# Patient Record
Sex: FEMALE | Race: WHITE | NOT HISPANIC OR LATINO | Employment: OTHER | URBAN - NONMETROPOLITAN AREA
[De-identification: names, ages, dates, MRNs, and addresses within clinical notes are randomized per-mention and may not be internally consistent; named-entity substitution may affect disease eponyms.]

---

## 2017-10-30 ENCOUNTER — OFFICE VISIT (OUTPATIENT)
Dept: OTOLARYNGOLOGY | Facility: CLINIC | Age: 67
End: 2017-10-30

## 2017-10-30 ENCOUNTER — PROCEDURE VISIT (OUTPATIENT)
Dept: OTOLARYNGOLOGY | Facility: CLINIC | Age: 67
End: 2017-10-30

## 2017-10-30 VITALS
DIASTOLIC BLOOD PRESSURE: 92 MMHG | SYSTOLIC BLOOD PRESSURE: 143 MMHG | BODY MASS INDEX: 35.49 KG/M2 | TEMPERATURE: 97.9 F | HEIGHT: 65 IN | HEART RATE: 73 BPM | WEIGHT: 213 LBS

## 2017-10-30 DIAGNOSIS — H90.3 SENSORINEURAL HEARING LOSS (SNHL), BILATERAL: ICD-10-CM

## 2017-10-30 DIAGNOSIS — H90.3 SENSORINEURAL HEARING LOSS (SNHL) OF BOTH EARS: Primary | ICD-10-CM

## 2017-10-30 DIAGNOSIS — H93.13 TINNITUS OF BOTH EARS: ICD-10-CM

## 2017-10-30 DIAGNOSIS — H93.13 TINNITUS OF BOTH EARS: Primary | ICD-10-CM

## 2017-10-30 PROCEDURE — 99213 OFFICE O/P EST LOW 20 MIN: CPT | Performed by: PHYSICIAN ASSISTANT

## 2017-10-30 RX ORDER — AMLODIPINE BESYLATE 5 MG/1
5 TABLET ORAL DAILY
COMMUNITY
Start: 2017-10-05

## 2017-10-30 RX ORDER — IBUPROFEN 800 MG/1
TABLET ORAL
Status: ON HOLD | COMMUNITY
Start: 2017-10-19 | End: 2018-10-04

## 2017-10-30 RX ORDER — LEVOTHYROXINE SODIUM 0.1 MG/1
TABLET ORAL
COMMUNITY
Start: 2017-08-24

## 2017-10-30 NOTE — PROGRESS NOTES
YOB: 1950  Location: [facility]  Location Address: [address] [place] [address], [facility], [address] [place] [address]  Location Phone: [phone]    Chief Complaint   Patient presents with   • Follow-up     ear problems       History of Present Illness  Kika Morocho is a 67 y.o. female.  Kika Morocho is here for follow up of ENT complaints. The patient has had problems with hearing loss  The symptoms are localized to both ears. The patient has had stable symptoms. The symptoms have been present for the last several years The symptoms are aggravated by  no identifiable factors. The symptoms are improved by no identifiable factors.    Procedure visit     10/30/2017  [facility]    Jessica Johnson CCC-LAST   Audiology    Tinnitus of both ears +1 more   Dx    Follow-up   Reason for visit    Progress Notes         CASE HISTORY DETAILS   Ms. Morocho presented to the clinic this date for a recheck of hearing due to bilateral tinnitus. She denied changes in symptoms since last visit.      SUMMARY   RIGHT  ¨ Otoscopy revealed clear EAC/Unremarkable TM.  ¨ Moderate high frequency sensorineural hearing loss (previously established).     LEFT  ¨ Otoscopy revealed clear EAC/Unremarkable TM.  ¨ Moderate high frequency sensorineural hearing loss (previously established).     Stable thresholds this date when compared to audiogram obtained in 2016.     RECOMMENDATIONS   Results of today's evaluation were discussed with Ms. Morocho and the following recommendations were made:  1. ENT evaluation today as scheduled.     AUDIOGRAM AND IMMITANCE        Selena Del Cid, ABDOULAYE-A  Audiologist      Instructions        Return for ENT evaluation today as scheduled..      Past Medical History:   Diagnosis Date   • Anemia    • GERD (gastroesophageal reflux disease)    • HLD (hyperlipidemia)    • HTN (hypertension)    • Osteopenia    • Tinnitus        Past Surgical History:   Procedure Laterality Date    • BREAST BIOPSY     • COLONOSCOPY     • DENTAL PROCEDURE     • GASTRIC BYPASS     • HERNIA REPAIR     • HYSTERECTOMY           Current Outpatient Prescriptions:   •  alendronate (FOSAMAX) 70 MG tablet, Take 70 mg by mouth Every 7 (Seven) Days., Disp: , Rfl:   •  amLODIPine (NORVASC) 2.5 MG tablet, , Disp: , Rfl:   •  atenolol (TENORMIN) 50 MG tablet, Take 50 mg by mouth Daily., Disp: , Rfl:   •  atorvastatin (LIPITOR) 20 MG tablet, Take 20 mg by mouth Daily., Disp: , Rfl:   •  Cyanocobalamin (VITAMIN B-12 IJ), Inject  as directed., Disp: , Rfl:   •  docusate sodium (COLACE) 100 MG capsule, Take 100 mg by mouth 2 (Two) Times a Day As Needed for constipation., Disp: , Rfl:   •  furosemide (LASIX) 40 MG tablet, Take 40 mg by mouth 2 (Two) Times a Day., Disp: , Rfl:   •  ibuprofen (ADVIL,MOTRIN) 800 MG tablet, , Disp: , Rfl:   •  levothyroxine (SYNTHROID, LEVOTHROID) 100 MCG tablet, , Disp: , Rfl:   •  losartan (COZAAR) 100 MG tablet, Take 100 mg by mouth Daily., Disp: , Rfl:   •  levothyroxine (SYNTHROID, LEVOTHROID) 150 MCG tablet, Take 150 mcg by mouth Daily., Disp: , Rfl:     Review of patient's allergies indicates no known allergies.    Family History   Problem Relation Age of Onset   • Diabetes Mother    • Stroke Mother    • Cancer Mother    • Heart failure Father    • Cancer Sister        Social History     Social History   • Marital status:      Spouse name: N/A   • Number of children: N/A   • Years of education: N/A     Occupational History   • Not on file.     Social History Main Topics   • Smoking status: Current Every Day Smoker     Packs/day: 0.50   • Smokeless tobacco: Never Used   • Alcohol use No   • Drug use: Defer   • Sexual activity: Not on file     Other Topics Concern   • Not on file     Social History Narrative       Review of Systems   Constitutional: Negative for activity change, appetite change, chills, diaphoresis, fatigue, fever and unexpected weight change.   HENT: Positive for  hearing loss. Negative for congestion, dental problem, drooling, ear discharge, ear pain, facial swelling, mouth sores, nosebleeds, postnasal drip, rhinorrhea, sinus pressure, sneezing, sore throat, tinnitus, trouble swallowing and voice change.    Eyes: Negative.    Respiratory: Negative.    Cardiovascular: Negative.    Gastrointestinal: Negative.    Endocrine: Negative.    Skin: Negative.    Allergic/Immunologic: Negative for environmental allergies, food allergies and immunocompromised state.   Neurological: Negative.    Hematological: Negative.    Psychiatric/Behavioral: Negative.        Vitals:    10/30/17 1423   BP: 143/92   Pulse: 73   Temp: 97.9 °F (36.6 °C)       Objective     Physical Exam  CONSTITUTIONAL: well nourished, alert, oriented, in no acute distress     COMMUNICATION AND VOICE: able to communicate normally, normal voice quality    HEAD: normocephalic, no lesions, atraumatic, no tenderness, no masses     FACE: appearance normal, no lesions, no tenderness, no deformities, facial motion symmetric    SALIVARY GLANDS: parotid glands with no tenderness, no swelling, no masses, submandibular glands with normal size, nontender    EYES: ocular motility normal, eyelids normal, orbits normal, no proptosis, conjunctiva normal , pupils equal, round     EARS:  Hearing: response to conversational voice normal bilaterally   External Ears: auricles without lesions  Otoscopic: tympanic membrane appearance normal, no lesions, no perforation, normal mobility, no fluid    NOSE:  External Nose: structure normal, no tenderness on palpation, no nasal discharge, no lesions, no evidence of trauma, nostrils patent   Intranasal Exam: nasal mucosa normal, vestibule within normal limits, inferior turbinate normal, nasal septum midline   Nasopharynx:     ORAL:  Lips: upper and lower lips without lesion   Teeth: dentition within normal limits for age   Gums: gingivae healthy   Oral Mucosa: oral mucosa normal, no mucosal lesions    Floor of Mouth: Warthin’s duct patent, mucosa normal  Tongue: lingual mucosa normal without lesions, normal tongue mobility   Palate: soft and hard palates with normal mucosa and structure  Oropharynx: oropharyngeal mucosa normal    NECK: neck appearance normal, no mass,  noted without erythema or tenderness    THYROID: no overt thyromegaly, no tenderness, nodules or mass present on palpation, position midline     LYMPH NODES: no lymphadenopathy    CHEST/RESPIRATORY: respiratory effort normal, normal breath sounds     CARDIOVASCULAR: rate and rhythm normal, extremities without cyanosis or edema      NEUROLOGIC/PSYCHIATRIC: oriented to time, place and person, mood normal, affect appropriate, CN II-XII intact grossly    Assessment/Plan   Problems Addressed this Visit        Nervous and Auditory    Tinnitus    Relevant Orders    Comprehensive Hearing Test    Hearing loss, sensorineural - Primary    Relevant Orders    Comprehensive Hearing Test        * Surgery not found *  Orders Placed This Encounter   Procedures   • Comprehensive Hearing Test     Standing Status:   Future     Standing Expiration Date:   10/30/2018     Order Specific Question:   Laterality     Answer:   Bilateral     Return in about 1 year (around 10/30/2018) for Recheck ears with audiogram.       Patient Instructions   Will continue to monitor hearing, if symptoms worsen advised to call for sooner appointment.

## 2017-10-30 NOTE — PROGRESS NOTES
CASE HISTORY DETAILS   Ms. Morocho presented to the clinic this date for a recheck of hearing due to bilateral tinnitus. She denied changes in symptoms since last visit.     SUMMARY   RIGHT  · Otoscopy revealed clear EAC/Unremarkable TM.  · Moderate high frequency sensorineural hearing loss (previously established).    LEFT  · Otoscopy revealed clear EAC/Unremarkable TM.  · Moderate high frequency sensorineural hearing loss (previously established).    Stable thresholds this date when compared to audiogram obtained in October 2016.    RECOMMENDATIONS   Results of today's evaluation were discussed with Ms. Morocho and the following recommendations were made:  1. ENT evaluation today as scheduled.    AUDIOGRAM AND IMMITANCE       Selena Del Cid, CCC-A  Audiologist

## 2018-06-14 ENCOUNTER — TRANSCRIBE ORDERS (OUTPATIENT)
Dept: ADMINISTRATIVE | Facility: HOSPITAL | Age: 68
End: 2018-06-14

## 2018-06-14 DIAGNOSIS — M54.16 LUMBAR RADICULOPATHY: Primary | ICD-10-CM

## 2018-06-19 ENCOUNTER — HOSPITAL ENCOUNTER (OUTPATIENT)
Dept: MRI IMAGING | Facility: HOSPITAL | Age: 68
Discharge: HOME | End: 2018-06-19
Attending: ORTHOPAEDIC SURGERY | Admitting: PHYSICIAN ASSISTANT

## 2018-06-19 DIAGNOSIS — M54.16 LUMBAR RADICULOPATHY: ICD-10-CM

## 2018-06-19 PROCEDURE — 72148 MRI LUMBAR SPINE W/O DYE: CPT

## 2018-10-04 ENCOUNTER — HOSPITAL ENCOUNTER (INPATIENT)
Facility: HOSPITAL | Age: 68
LOS: 1 days | Discharge: HOME | End: 2018-10-07
Attending: INTERNAL MEDICINE | Admitting: INTERNAL MEDICINE

## 2018-10-04 DIAGNOSIS — R07.89 ATYPICAL CHEST PAIN: ICD-10-CM

## 2018-10-04 LAB — TROPONIN I SERPL-MCNC: <0.012 NG/ML (ref 0–0.03)

## 2018-10-04 PROCEDURE — 25010000002 HYDROMORPHONE PER 4 MG

## 2018-10-04 PROCEDURE — G0378 HOSPITAL OBSERVATION PER HR: HCPCS

## 2018-10-04 PROCEDURE — 84484 ASSAY OF TROPONIN QUANT: CPT | Performed by: NURSE PRACTITIONER

## 2018-10-04 PROCEDURE — 25010000002 PROMETHAZINE PER 50 MG: Performed by: NURSE PRACTITIONER

## 2018-10-04 RX ORDER — SODIUM CHLORIDE 0.9 % (FLUSH) 0.9 %
3 SYRINGE (ML) INJECTION EVERY 12 HOURS SCHEDULED
Status: DISCONTINUED | OUTPATIENT
Start: 2018-10-04 | End: 2018-10-07 | Stop reason: HOSPADM

## 2018-10-04 RX ORDER — ATORVASTATIN CALCIUM 10 MG/1
20 TABLET, FILM COATED ORAL DAILY
Status: DISCONTINUED | OUTPATIENT
Start: 2018-10-05 | End: 2018-10-07 | Stop reason: HOSPADM

## 2018-10-04 RX ORDER — ONDANSETRON 2 MG/ML
4 INJECTION INTRAMUSCULAR; INTRAVENOUS EVERY 6 HOURS PRN
Status: DISCONTINUED | OUTPATIENT
Start: 2018-10-04 | End: 2018-10-07 | Stop reason: HOSPADM

## 2018-10-04 RX ORDER — SODIUM CHLORIDE 0.9 % (FLUSH) 0.9 %
3-10 SYRINGE (ML) INJECTION AS NEEDED
Status: DISCONTINUED | OUTPATIENT
Start: 2018-10-04 | End: 2018-10-07 | Stop reason: HOSPADM

## 2018-10-04 RX ORDER — LOSARTAN POTASSIUM 50 MG/1
100 TABLET ORAL DAILY
Status: DISCONTINUED | OUTPATIENT
Start: 2018-10-05 | End: 2018-10-07 | Stop reason: HOSPADM

## 2018-10-04 RX ORDER — PROMETHAZINE HYDROCHLORIDE 25 MG/ML
12.5 INJECTION, SOLUTION INTRAMUSCULAR; INTRAVENOUS EVERY 6 HOURS PRN
Status: DISCONTINUED | OUTPATIENT
Start: 2018-10-04 | End: 2018-10-07 | Stop reason: HOSPADM

## 2018-10-04 RX ORDER — HYDROMORPHONE HYDROCHLORIDE 1 MG/ML
0.5 INJECTION, SOLUTION INTRAMUSCULAR; INTRAVENOUS; SUBCUTANEOUS
Status: DISCONTINUED | OUTPATIENT
Start: 2018-10-04 | End: 2018-10-06

## 2018-10-04 RX ORDER — ATENOLOL 50 MG/1
50 TABLET ORAL 2 TIMES DAILY
Status: DISCONTINUED | OUTPATIENT
Start: 2018-10-04 | End: 2018-10-07 | Stop reason: HOSPADM

## 2018-10-04 RX ORDER — AMLODIPINE BESYLATE 5 MG/1
5 TABLET ORAL
Status: DISCONTINUED | OUTPATIENT
Start: 2018-10-05 | End: 2018-10-07 | Stop reason: HOSPADM

## 2018-10-04 RX ORDER — SODIUM CHLORIDE 9 MG/ML
100 INJECTION, SOLUTION INTRAVENOUS CONTINUOUS
Status: DISCONTINUED | OUTPATIENT
Start: 2018-10-04 | End: 2018-10-05

## 2018-10-04 RX ORDER — LEVOTHYROXINE SODIUM 0.1 MG/1
100 TABLET ORAL
Status: DISCONTINUED | OUTPATIENT
Start: 2018-10-05 | End: 2018-10-07 | Stop reason: HOSPADM

## 2018-10-04 RX ORDER — PROMETHAZINE HYDROCHLORIDE 25 MG/ML
12.5 INJECTION, SOLUTION INTRAMUSCULAR; INTRAVENOUS ONCE
Status: COMPLETED | OUTPATIENT
Start: 2018-10-04 | End: 2018-10-04

## 2018-10-04 RX ORDER — NITROGLYCERIN 0.4 MG/1
TABLET SUBLINGUAL
Status: COMPLETED
Start: 2018-10-04 | End: 2018-10-04

## 2018-10-04 RX ORDER — PROMETHAZINE HYDROCHLORIDE 12.5 MG/1
12.5 SUPPOSITORY RECTAL EVERY 6 HOURS PRN
Status: DISCONTINUED | OUTPATIENT
Start: 2018-10-04 | End: 2018-10-07 | Stop reason: HOSPADM

## 2018-10-04 RX ORDER — NALOXONE HCL 0.4 MG/ML
0.4 VIAL (ML) INJECTION
Status: DISCONTINUED | OUTPATIENT
Start: 2018-10-04 | End: 2018-10-07 | Stop reason: HOSPADM

## 2018-10-04 RX ORDER — NICOTINE POLACRILEX 4 MG
15 LOZENGE BUCCAL
Status: DISCONTINUED | OUTPATIENT
Start: 2018-10-04 | End: 2018-10-07 | Stop reason: HOSPADM

## 2018-10-04 RX ORDER — PROMETHAZINE HYDROCHLORIDE 25 MG/1
12.5 TABLET ORAL EVERY 6 HOURS PRN
Status: DISCONTINUED | OUTPATIENT
Start: 2018-10-04 | End: 2018-10-07 | Stop reason: HOSPADM

## 2018-10-04 RX ORDER — PANTOPRAZOLE SODIUM 40 MG/10ML
40 INJECTION, POWDER, LYOPHILIZED, FOR SOLUTION INTRAVENOUS
Status: DISCONTINUED | OUTPATIENT
Start: 2018-10-04 | End: 2018-10-07 | Stop reason: HOSPADM

## 2018-10-04 RX ORDER — DEXTROSE MONOHYDRATE 25 G/50ML
25 INJECTION, SOLUTION INTRAVENOUS
Status: DISCONTINUED | OUTPATIENT
Start: 2018-10-04 | End: 2018-10-07 | Stop reason: HOSPADM

## 2018-10-04 RX ADMIN — SODIUM CHLORIDE 100 ML/HR: 9 INJECTION, SOLUTION INTRAVENOUS at 22:58

## 2018-10-04 RX ADMIN — NITROGLYCERIN: 0.4 TABLET SUBLINGUAL at 22:10

## 2018-10-04 RX ADMIN — HYDROMORPHONE HYDROCHLORIDE 0.5 MG: 1 INJECTION, SOLUTION INTRAMUSCULAR; INTRAVENOUS; SUBCUTANEOUS at 22:11

## 2018-10-04 RX ADMIN — PROMETHAZINE HYDROCHLORIDE 12.5 MG: 25 INJECTION INTRAMUSCULAR; INTRAVENOUS at 22:59

## 2018-10-05 ENCOUNTER — ANESTHESIA EVENT (OUTPATIENT)
Dept: GASTROENTEROLOGY | Facility: HOSPITAL | Age: 68
End: 2018-10-05

## 2018-10-05 ENCOUNTER — ANESTHESIA (OUTPATIENT)
Dept: GASTROENTEROLOGY | Facility: HOSPITAL | Age: 68
End: 2018-10-05

## 2018-10-05 LAB
ANION GAP SERPL CALCULATED.3IONS-SCNC: 8 MMOL/L (ref 4–13)
ARTICHOKE IGE QN: 38 MG/DL (ref 0–99)
BUN BLD-MCNC: 14 MG/DL (ref 5–21)
BUN/CREAT SERPL: 14.7 (ref 7–25)
CALCIUM SPEC-SCNC: 8.4 MG/DL (ref 8.4–10.4)
CHLORIDE SERPL-SCNC: 107 MMOL/L (ref 98–110)
CHOLEST SERPL-MCNC: 90 MG/DL (ref 130–200)
CO2 SERPL-SCNC: 24 MMOL/L (ref 24–31)
CREAT BLD-MCNC: 0.95 MG/DL (ref 0.5–1.4)
DEPRECATED RDW RBC AUTO: 47.3 FL (ref 40–54)
ERYTHROCYTE [DISTWIDTH] IN BLOOD BY AUTOMATED COUNT: 14.6 % (ref 12–15)
GFR SERPL CREATININE-BSD FRML MDRD: 58 ML/MIN/1.73
GLUCOSE BLD-MCNC: 119 MG/DL (ref 70–100)
GLUCOSE BLDC GLUCOMTR-MCNC: 104 MG/DL (ref 70–130)
GLUCOSE BLDC GLUCOMTR-MCNC: 110 MG/DL (ref 70–130)
GLUCOSE BLDC GLUCOMTR-MCNC: 115 MG/DL (ref 70–130)
GLUCOSE BLDC GLUCOMTR-MCNC: 115 MG/DL (ref 70–130)
GLUCOSE BLDC GLUCOMTR-MCNC: 137 MG/DL (ref 70–130)
HBA1C MFR BLD: 12.2 %
HCT VFR BLD AUTO: 33.2 % (ref 37–47)
HDLC SERPL-MCNC: 30 MG/DL
HGB BLD-MCNC: 11.2 G/DL (ref 12–16)
LDLC/HDLC SERPL: 1.49 {RATIO}
MCH RBC QN AUTO: 30.1 PG (ref 28–32)
MCHC RBC AUTO-ENTMCNC: 33.7 G/DL (ref 33–36)
MCV RBC AUTO: 89.2 FL (ref 82–98)
PLATELET # BLD AUTO: 204 10*3/MM3 (ref 130–400)
PMV BLD AUTO: 11.9 FL (ref 6–12)
POTASSIUM BLD-SCNC: 4.2 MMOL/L (ref 3.5–5.3)
RBC # BLD AUTO: 3.72 10*6/MM3 (ref 4.2–5.4)
SODIUM BLD-SCNC: 139 MMOL/L (ref 135–145)
TRIGL SERPL-MCNC: 77 MG/DL (ref 0–149)
TROPONIN I SERPL-MCNC: <0.012 NG/ML (ref 0–0.03)
TSH SERPL DL<=0.05 MIU/L-ACNC: 1.25 MIU/ML (ref 0.47–4.68)
WBC NRBC COR # BLD: 5.6 10*3/MM3 (ref 4.8–10.8)

## 2018-10-05 PROCEDURE — 80048 BASIC METABOLIC PNL TOTAL CA: CPT | Performed by: NURSE PRACTITIONER

## 2018-10-05 PROCEDURE — G0378 HOSPITAL OBSERVATION PER HR: HCPCS

## 2018-10-05 PROCEDURE — 94799 UNLISTED PULMONARY SVC/PX: CPT

## 2018-10-05 PROCEDURE — 0DB38ZX EXCISION OF LOWER ESOPHAGUS, VIA NATURAL OR ARTIFICIAL OPENING ENDOSCOPIC, DIAGNOSTIC: ICD-10-PCS | Performed by: INTERNAL MEDICINE

## 2018-10-05 PROCEDURE — 82962 GLUCOSE BLOOD TEST: CPT

## 2018-10-05 PROCEDURE — 85027 COMPLETE CBC AUTOMATED: CPT | Performed by: NURSE PRACTITIONER

## 2018-10-05 PROCEDURE — 25010000002 HYDROMORPHONE PER 4 MG: Performed by: NURSE PRACTITIONER

## 2018-10-05 PROCEDURE — 80061 LIPID PANEL: CPT | Performed by: NURSE PRACTITIONER

## 2018-10-05 PROCEDURE — 83036 HEMOGLOBIN GLYCOSYLATED A1C: CPT | Performed by: NURSE PRACTITIONER

## 2018-10-05 PROCEDURE — 43235 EGD DIAGNOSTIC BRUSH WASH: CPT | Performed by: INTERNAL MEDICINE

## 2018-10-05 PROCEDURE — 84484 ASSAY OF TROPONIN QUANT: CPT | Performed by: NURSE PRACTITIONER

## 2018-10-05 PROCEDURE — 25010000002 PROPOFOL 10 MG/ML EMULSION: Performed by: NURSE ANESTHETIST, CERTIFIED REGISTERED

## 2018-10-05 PROCEDURE — 88305 TISSUE EXAM BY PATHOLOGIST: CPT | Performed by: INTERNAL MEDICINE

## 2018-10-05 PROCEDURE — 84443 ASSAY THYROID STIM HORMONE: CPT | Performed by: NURSE PRACTITIONER

## 2018-10-05 PROCEDURE — 25010000002 ONDANSETRON PER 1 MG: Performed by: NURSE PRACTITIONER

## 2018-10-05 PROCEDURE — 99222 1ST HOSP IP/OBS MODERATE 55: CPT | Performed by: INTERNAL MEDICINE

## 2018-10-05 PROCEDURE — 94760 N-INVAS EAR/PLS OXIMETRY 1: CPT

## 2018-10-05 RX ORDER — PROPOFOL 10 MG/ML
VIAL (ML) INTRAVENOUS AS NEEDED
Status: DISCONTINUED | OUTPATIENT
Start: 2018-10-05 | End: 2018-10-05 | Stop reason: SURG

## 2018-10-05 RX ORDER — SUCRALFATE ORAL 1 G/10ML
1 SUSPENSION ORAL EVERY 6 HOURS SCHEDULED
Status: DISCONTINUED | OUTPATIENT
Start: 2018-10-05 | End: 2018-10-07 | Stop reason: HOSPADM

## 2018-10-05 RX ORDER — SODIUM CHLORIDE 0.9 % (FLUSH) 0.9 %
3-10 SYRINGE (ML) INJECTION AS NEEDED
Status: DISCONTINUED | OUTPATIENT
Start: 2018-10-05 | End: 2018-10-05 | Stop reason: HOSPADM

## 2018-10-05 RX ORDER — SODIUM CHLORIDE 0.9 % (FLUSH) 0.9 %
3 SYRINGE (ML) INJECTION EVERY 12 HOURS SCHEDULED
Status: DISCONTINUED | OUTPATIENT
Start: 2018-10-05 | End: 2018-10-05 | Stop reason: HOSPADM

## 2018-10-05 RX ORDER — METOPROLOL TARTRATE 5 MG/5ML
2 INJECTION INTRAVENOUS ONCE AS NEEDED
Status: CANCELLED | OUTPATIENT
Start: 2018-10-05

## 2018-10-05 RX ORDER — SODIUM CHLORIDE 9 MG/ML
100 INJECTION, SOLUTION INTRAVENOUS CONTINUOUS
Status: DISCONTINUED | OUTPATIENT
Start: 2018-10-05 | End: 2018-10-06

## 2018-10-05 RX ORDER — LIDOCAINE HYDROCHLORIDE 20 MG/ML
INJECTION, SOLUTION INFILTRATION; PERINEURAL AS NEEDED
Status: DISCONTINUED | OUTPATIENT
Start: 2018-10-05 | End: 2018-10-05 | Stop reason: SURG

## 2018-10-05 RX ADMIN — Medication 3 ML: at 08:03

## 2018-10-05 RX ADMIN — AMLODIPINE BESYLATE 5 MG: 5 TABLET ORAL at 08:02

## 2018-10-05 RX ADMIN — SODIUM CHLORIDE 100 ML/HR: 9 INJECTION, SOLUTION INTRAVENOUS at 09:36

## 2018-10-05 RX ADMIN — NYSTATIN 500000 UNITS: 500000 SUSPENSION ORAL at 11:26

## 2018-10-05 RX ADMIN — PANTOPRAZOLE SODIUM 40 MG: 40 INJECTION, POWDER, FOR SOLUTION INTRAVENOUS at 06:35

## 2018-10-05 RX ADMIN — LOSARTAN POTASSIUM 100 MG: 50 TABLET ORAL at 08:02

## 2018-10-05 RX ADMIN — SODIUM CHLORIDE 100 ML/HR: 9 INJECTION, SOLUTION INTRAVENOUS at 08:09

## 2018-10-05 RX ADMIN — SUCRALFATE 1 G: 1 SUSPENSION ORAL at 23:31

## 2018-10-05 RX ADMIN — PROPOFOL 100 MG: 10 INJECTION, EMULSION INTRAVENOUS at 09:49

## 2018-10-05 RX ADMIN — HYDROMORPHONE HYDROCHLORIDE 0.5 MG: 1 INJECTION, SOLUTION INTRAMUSCULAR; INTRAVENOUS; SUBCUTANEOUS at 20:52

## 2018-10-05 RX ADMIN — PANTOPRAZOLE SODIUM 40 MG: 40 INJECTION, POWDER, FOR SOLUTION INTRAVENOUS at 00:00

## 2018-10-05 RX ADMIN — LIDOCAINE HYDROCHLORIDE 80 MG: 20 INJECTION, SOLUTION INFILTRATION; PERINEURAL at 09:49

## 2018-10-05 RX ADMIN — SODIUM CHLORIDE 100 ML/HR: 9 INJECTION, SOLUTION INTRAVENOUS at 20:52

## 2018-10-05 RX ADMIN — ATENOLOL 50 MG: 50 TABLET ORAL at 00:00

## 2018-10-05 RX ADMIN — NYSTATIN 500000 UNITS: 500000 SUSPENSION ORAL at 20:20

## 2018-10-05 RX ADMIN — ATENOLOL 50 MG: 50 TABLET ORAL at 08:03

## 2018-10-05 RX ADMIN — ATORVASTATIN CALCIUM 20 MG: 10 TABLET, FILM COATED ORAL at 08:02

## 2018-10-05 RX ADMIN — HYDROMORPHONE HYDROCHLORIDE 0.5 MG: 1 INJECTION, SOLUTION INTRAMUSCULAR; INTRAVENOUS; SUBCUTANEOUS at 03:54

## 2018-10-05 RX ADMIN — HYDROMORPHONE HYDROCHLORIDE 0.5 MG: 1 INJECTION, SOLUTION INTRAMUSCULAR; INTRAVENOUS; SUBCUTANEOUS at 08:08

## 2018-10-05 RX ADMIN — HYDROMORPHONE HYDROCHLORIDE 0.5 MG: 1 INJECTION, SOLUTION INTRAMUSCULAR; INTRAVENOUS; SUBCUTANEOUS at 16:02

## 2018-10-05 RX ADMIN — SUCRALFATE 1 G: 1 SUSPENSION ORAL at 17:05

## 2018-10-05 RX ADMIN — NYSTATIN 500000 UNITS: 500000 SUSPENSION ORAL at 17:05

## 2018-10-05 RX ADMIN — ONDANSETRON 4 MG: 2 INJECTION INTRAMUSCULAR; INTRAVENOUS at 08:08

## 2018-10-05 NOTE — PROGRESS NOTES
[facility] Medicine Services  INPATIENT PROGRESS NOTE    Patient Name: Kika Morocho  Date of Admission: 10/4/2018  Today's Date: 10/05/18  Length of Stay: 0  Primary Care Physician: Zelda Hahn APRN    Subjective   Chief Complaint: Follow-up chest pain  HPI   Patient did not has low chest pain and/epigastric pain since complaining of dysphagia/odynophagia.  She is scheduled for GI evaluation today.  Patient has been nothing by mouth.  States that she felt like she could vomit or  belch that some of the pressure in her chest would get better.  Troponins are negative ×2.    Review of Systems  All pertinent negatives and positives are as above. All other systems have been reviewed and are negative unless otherwise stated.     Objective    Temp:  [97.3 °F (36.3 °C)-98.9 °F (37.2 °C)] 98.4 °F (36.9 °C)  Heart Rate:  [58-73] 58  Resp:  [14-18] 16  BP: (106-133)/(46-65) 133/61  Physical Exam   Constitutional: She is oriented to person, place, and time. She appears well-developed and well-nourished.   HENT:   Head: Normocephalic and atraumatic.   Eyes: Pupils are equal, round, and reactive to light. Conjunctivae and EOM are normal.   Neck: Neck supple. No JVD present. No thyromegaly present.   Cardiovascular: Normal rate, regular rhythm, normal heart sounds and intact distal pulses.  Exam reveals no gallop and no friction rub.    No murmur heard.  Sinus 62-73   Pulmonary/Chest: Effort normal and breath sounds normal. No respiratory distress. She has no wheezes. She has no rales. She exhibits no tenderness.   Abdominal: Soft. Bowel sounds are normal. She exhibits no distension. There is tenderness (epigastric). There is no rebound and no guarding.   Musculoskeletal: Normal range of motion. She exhibits no edema, tenderness or deformity.   Lymphadenopathy:     She has no cervical adenopathy.   Neurological: She is alert and oriented to person, place, and time. She displays normal  reflexes. No cranial nerve deficit. She exhibits normal muscle tone.   Skin: Skin is warm and dry. No rash noted.   Psychiatric: She has a normal mood and affect. Her behavior is normal. Judgment and thought content normal.     Results Review:  I have reviewed the labs, radiology results, and diagnostic studies.    Laboratory Data:     Results from last 7 days  Lab Units 10/05/18  0503   WBC 10*3/mm3 5.60   HEMOGLOBIN g/dL 11.2*   HEMATOCRIT % 33.2*   PLATELETS 10*3/mm3 204       Results from last 7 days  Lab Units 10/05/18  0503   SODIUM mmol/L 139   POTASSIUM mmol/L 4.2   CHLORIDE mmol/L 107   CO2 mmol/L 24.0   BUN mg/dL 14   CREATININE mg/dL 0.95   CALCIUM mg/dL 8.4   GLUCOSE mg/dL 119*       Radiology Data:   Imaging Results (last 24 hours)     ** No results found for the last 24 hours. **          I have reviewed the patient's current medications.     Assessment/Plan   Assessment:  1. Atypical chest pain, suspect esophageal spasms  2. Nausea with vomiting  3. Odynophagia   4. Diabetes mellitus type II-uncontrolled with hyperglycemia-A1C 12.2%  5. Hypothyroidism  6. Hypertension  7. Gastroesophageal reflux disease  8. History of gastric bypass in the remote past    Plan:  1.  Continue Protonix IV twice daily  2.  Nothing by mouth until seen by GI  3.  Diet after GI evaluation and endoscopy.  4.  She will need a dietitian and diabetic educator consults given an A1c of 12.2.  5.  Metformin on hold at present time.     Discharge Planning: I expect the patient to be discharged to home in ? days.    MARSHALL Sanford   10/05/18   12:35 PM     I personally evaluated and examined the patient in conjunction with MARSHALL Reyes and agree with the assessment, treatment plan, and disposition of the patient as recorded by her. My history, exam, and further recommendations are: I reviewed the endoscopy results with patient, and in fact we looked at the images in the computer in her room.  Follow-up pathology  results.  Continue PPI.  Trial of Carafate suspension.  Currently on clear liquids.  Appreciate gastroenterology assistance.        Matthias Worley MD  10/05/18  2:34 PM

## 2018-10-05 NOTE — H&P
[facility] Medicine Services  HISTORY AND PHYSICAL    Date of Admission: 10/4/2018  Primary Care Physician: Zelda Hahn APRN    Subjective     Chief Complaint: painful swallowing and feeling of choking    History of Present Illness  Saeid menendez is a 68-year-old  female with a past medical history of gastroesophageal reflux disease, hyperlipidemia, hypertension, hypothyroidism, chronic anemia, and gastric bypass.  Patient states after her gastric bypass she had occasional episodes of esophageal spasms with nausea, vomiting and severe pain. Frequency of episodes have reduced over time and it has been several years since last episode.  Patient states yesterday a.m. she developed midsternal pain, was unable to eat or drink, and developed nausea and dry heaves.  She did seek evaluation at [facility] emergency department today.  She received GI cocktail and Zofran with out significant improvement of symptoms.  Her pain is severe when it occurs, it is intermittent, I gave her a nitroglycerin which did not alleviate her discomfort.  Pain is located mid chest down to mid epigastric and occasionally in her throat, it waxes and wanes in intensity.  She denies shortness of breathing, diaphoresis, changes in bowel or bladder habits.  Patient is a smoker however has been unable to tolerate cigarettes since yesterday morning.  Patient was transferred to [facility] emergency department for higher level of care and is admitted as observation status.    Review of Systems   A 10 point review of systems was completed, negative except for those discussed in HPI    Past Medical History:   Past Medical History:   Diagnosis Date   • Anemia    • Disease of thyroid gland    • GERD (gastroesophageal reflux disease)    • HLD (hyperlipidemia)    • HTN (hypertension)    • Osteopenia    • Tinnitus        Past Surgical History:   Past Surgical History:   Procedure Laterality  "Date   • BREAST BIOPSY     • COLONOSCOPY     • DENTAL PROCEDURE     • ENDOSCOPY      Per Dr. Cruz   • GASTRIC BYPASS     • HERNIA REPAIR     • HYSTERECTOMY         Family History: family history includes Cancer in her mother and sister; Diabetes in her mother; Heart failure in her father; Stroke in her mother.    Social History:  reports that she has been smoking.  She has been smoking about 0.50 packs per day. She has never used smokeless tobacco. Drug use questions deferred to the physician. She reports that she does not drink alcohol.    Code Status: Full, if unable speak for herself her son or sister will speak for her      Allergies:  No Known Allergies    Medications:  Prior to Admission medications    Medication Sig Start Date End Date Taking? Authorizing Provider   alendronate (FOSAMAX) 70 MG tablet Take 70 mg by mouth Every 7 (Seven) Days.    Bubba Beal MD   amLODIPine (NORVASC) 2.5 MG tablet  10/5/17   Bubba Beal MD   atenolol (TENORMIN) 50 MG tablet Take 50 mg by mouth Daily.    Bubba Beal MD   atorvastatin (LIPITOR) 20 MG tablet Take 20 mg by mouth Daily.    Bubba Beal MD   Cyanocobalamin (VITAMIN B-12 IJ) Inject  as directed.    Bubba Beal MD   docusate sodium (COLACE) 100 MG capsule Take 100 mg by mouth 2 (Two) Times a Day As Needed for constipation.    Bubba Beal MD   furosemide (LASIX) 40 MG tablet Take 40 mg by mouth 2 (Two) Times a Day.    Bubba Beal MD   levothyroxine (SYNTHROID, LEVOTHROID) 100 MCG tablet  8/24/17   Bubba Beal MD   losartan (COZAAR) 100 MG tablet Take 100 mg by mouth Daily.    Bubba Beal MD   Glucophage 500 mg by mouth twice a day    Objective     /50 (BP Location: Right arm, Patient Position: Lying)   Pulse 73   Temp 98.9 °F (37.2 °C) (Oral)   Resp 18   Ht 162.6 cm (64\")   Wt 86.5 kg (190 lb 9.6 oz)   SpO2 98%   BMI 32.72 kg/m²      Physical Exam   Constitutional: " She is oriented to person, place, and time. She appears well-developed and well-nourished. No distress.   HENT:   Head: Normocephalic and atraumatic.   Eyes: Pupils are equal, round, and reactive to light. Conjunctivae and EOM are normal. No scleral icterus.   Neck: Normal range of motion. Neck supple. No JVD present. No tracheal deviation present.   Cardiovascular: Normal rate, regular rhythm, normal heart sounds and intact distal pulses.  Exam reveals no gallop.    No murmur heard.  Pulmonary/Chest: Effort normal and breath sounds normal. No respiratory distress. She has no wheezes. She has no rales.   Abdominal: Soft. Bowel sounds are normal. She exhibits no distension. There is no tenderness. There is no guarding.   Frequent episodes of apparent severe epigastric midsternal chest discomfort   Musculoskeletal: Normal range of motion. She exhibits no edema.   Neurological: She is alert and oriented to person, place, and time.   No obvious deficits noted.   Skin: Skin is warm and dry. No rash noted. She is not diaphoretic. No erythema. No pallor.   Psychiatric: She has a normal mood and affect. Her behavior is normal.   Vitals reviewed.      Pertinent Data: Clitoris studies obtained at [facility]  WBC 8.4, hemoglobin 13.4, hematocrit 21.8, platelet count 251,000, CK-MB less than 0.2, myoglobin 29.8, troponin less than 0.012  Sodium 136, potassium 4.3, chloride 104, CO2 23, BUN 16, creatinine 1.1, GFR 49 meals/minute, glucose 128, serum osmolality 285, amylase 29, lipase 20,  EKG normal sinus rhythm 71  CC T abdomen and pelvis-called and discussed with Dr. Kerns as there was no report, he verbally reported history of Billroth I or II and no acute findings    I have personally reviewed ECG obtained at outlying facility.    Assessment / Plan     Assessment:    Atypical chest pain, suspect esophageal spasms  Nausea with vomiting  Odynophagia   Diabetes mellitus type  II  Hypothyroidism  Hypertension  Gastroesophageal reflux disease  History of gastric bypass in the remote past    Plan:   1.  Admit as observation  2.  Consult GI in a.m.  3.  Nothing by mouth except for ice chips  4.  Home medications reviewed and restarted as appropriate will need to hold until cleared by GI  5.  DVT prophylaxis with SCDs only  6.  Serial troponins  7.  Pain control with Dilaudid 0.5 mg every 2 hours as needed for severe pain  8.  Protonix IV 40 mg every 24 hours until cleared by GI for oral  9.  Hydrate with normal saline at 100 ML/hour  10.  Nausea control with Phenergan AL are IV 12.5 mg as needed      I discussed the patient's findings and my recommendations with: Anand Forde MD  Time spent: 40 minutes      MARSHALL Walden  10/04/18   10:41 PM   I personally evaluated  the patient in conjunction withRuth OLIVARES and agree with the assessment, treatment plan, and disposition of the patient as recorded by her.  Transferred by Dr. Kerns from [facility]  Presenting with epigastric pain, nausea nd vomiting.  He stated she gets yearly EGD.  Has known hx of  Gastric bypass (reportedly Billroth 2 based on CT of abd/pelvis) done at transferring facility. Labs reportedly normal:  WBC 8.4, hemoglobin 13.4, hematocrit 21.8, platelet count 251,000, CK-MB less than 0.2, myoglobin 29.8, troponin less than 0.012  Sodium 136, potassium 4.3, chloride 104, CO2 23, BUN 16, creatinine 1.1, GFR 49 meals/minute, glucose 128, serum osmolality 285, amylase 29, lipase 20,  CT of abd/pelvis reportedly no acute findings.  Non contrast exam.  Vitals:    10/05/18 0406   BP: 108/46   Pulse: 66   Resp: 18   Temp: 97.3 °F (36.3 °C)   SpO2: 96%     GI consult  Agree with above      Anand Forde MD  10/05/18  6:03 AM

## 2018-10-05 NOTE — PLAN OF CARE
Problem: Patient Care Overview  Goal: Plan of Care Review   10/05/18 1537   Coping/Psychosocial   Plan of Care Reviewed With patient   Plan of Care Review   Progress no change   OTHER   Outcome Summary Pt with HbA1c of 12.2. She reports that she was started on Trulicity and insulin ~1 month ago. She reports that she has already made dietary changes since finding out her BS was running so high. Provided further DM diet education to pt and provided literature to take home to cont making healthy food choices. She reports her esophagus is inflamed and she is in pain. Encouraged to call RD with further questions s/p d/c. She has seen the diabetes educator. She is currently on a clear liquid diet.       Problem: Diabetes, Type 2 (Adult)  Goal: Signs and Symptoms of Listed Potential Problems Will be Absent, Minimized or Managed (Diabetes, Type 2)  Outcome: Ongoing (interventions implemented as appropriate)

## 2018-10-05 NOTE — PLAN OF CARE
Problem: Patient Care Overview  Goal: Plan of Care Review  Outcome: Ongoing (interventions implemented as appropriate)    Goal: Individualization and Mutuality  Outcome: Ongoing (interventions implemented as appropriate)   10/05/18 0436   Individualization   Patient Specific Preferences Pt prefers lights out, door closed   Patient Specific Goals (Include Timeframe) Decreased pain; decreased nausea   Patient Specific Interventions Prn pain meds; prn antiemetics   Mutuality/Individual Preferences   What Anxieties, Fears, Concerns, or Questions Do You Have About Your Care? none at this time   What Information Would Help Us Give You More Personalized Care? none at this time   How Would You and/or Your Support Person Like to Participate in Your Care? none at this time     Goal: Discharge Needs Assessment  Outcome: Ongoing (interventions implemented as appropriate)   10/04/18 2125 10/05/18 0436   Discharge Needs Assessment   Readmission Within the Last 30 Days --  no previous admission in last 30 days   Patient/Family Anticipates Transition to home with family --    Patient/Family Anticipated Services at Transition none --    Transportation Anticipated car, drives self;family or friend will provide --    Disability   Equipment Currently Used at Home none --      Goal: Interprofessional Rounds/Family Conf  Outcome: Ongoing (interventions implemented as appropriate)   10/05/18 0436   Interdisciplinary Rounds/Family Conf   Participants nursing;patient       Problem: Pain, Acute (Adult)  Goal: Identify Related Risk Factors and Signs and Symptoms  Outcome: Ongoing (interventions implemented as appropriate)   10/05/18 0436   Pain, Acute (Adult)   Related Risk Factors (Acute Pain) persistent pain   Signs and Symptoms (Acute Pain) verbalization of pain descriptors;guarding/abnormal posturing/positioning     Goal: Acceptable Pain Control/Comfort Level  Outcome: Ongoing (interventions implemented as appropriate)   10/05/18 0436    Pain, Acute (Adult)   Acceptable Pain Control/Comfort Level making progress toward outcome       Problem: Nausea/Vomiting (Adult)  Goal: Identify Related Risk Factors and Signs and Symptoms  Outcome: Ongoing (interventions implemented as appropriate)   10/05/18 0436   Nausea/Vomiting (Adult)   Related Risk Factors (Nausea/Vomiting) gastric irritation   Signs and Symptoms (Nausea/Vomiting) abdominal discomfort/pain     Goal: Symptom Relief  Outcome: Ongoing (interventions implemented as appropriate)   10/05/18 0436   Nausea/Vomiting (Adult)   Symptom Relief making progress toward outcome     Goal: Adequate Hydration  Outcome: Ongoing (interventions implemented as appropriate)   10/05/18 0436   Nausea/Vomiting (Adult)   Adequate Hydration making progress toward outcome

## 2018-10-05 NOTE — CONSULTS
[facility] GASTROENTEROLOGY              Initial Inpatient Consult Note  Kika Morocho  1950    Referring Provider: Anand Forde*    Admission: 10/4/2018  Consult date: 10/5/2018  Chief complaint: dysphagia    Subjective     History of present illness:  Pt is a 68 year old female admitted with GERD, esophageal spasms with nausea vomiting and severe pain. Pain is midsternal. She is unable to maintain any oral intake. She had improvement with GI cocktail and Zofran. Pain is severe no triggers other than oral intake. This has been progressive ongoing for months. No alleviating factors. No other associated symptoms. No diaphoresis, shortness of breath, change in bowels. NO melena. NO BRBPR. No wt loss. She does smoke 1/2 ppd. She is s/p gastric bypass in the 80s. She has had Endoscopy years ago by Dr Caruso. Her colonoscopies have been performed by Dr Yang with hx of colon polyps 11/24/2008 hyperplastic. No family hx for colon cancer.      Past Medical History:  Past Medical History:   Diagnosis Date   • Anemia    • Disease of thyroid gland    • GERD (gastroesophageal reflux disease)    • HLD (hyperlipidemia)    • HTN (hypertension)    • Osteopenia    • Tinnitus        Past Surgical History:  Past Surgical History:   Procedure Laterality Date   • BREAST BIOPSY     • COLONOSCOPY     • DENTAL PROCEDURE     • ENDOSCOPY      Per Dr. Cruz   • GASTRIC BYPASS     • HERNIA REPAIR     • HYSTERECTOMY          Social History:   Social History   Substance Use Topics   • Smoking status: Current Every Day Smoker     Packs/day: 0.50   • Smokeless tobacco: Never Used   • Alcohol use No        Family History:  Family History   Problem Relation Age of Onset   • Diabetes Mother    • Stroke Mother    • Cancer Mother    • Heart failure Father    • Cancer Sister        Home Meds:  Prescriptions Prior to Admission   Medication Sig Dispense Refill Last Dose   • alendronate (FOSAMAX) 70 MG tablet Take 70 mg by  mouth Every 7 (Seven) Days.   9/30/2018 at Unknown time   • amLODIPine (NORVASC) 5 MG tablet 5 mg Daily.   10/4/2018 at 0800   • atenolol (TENORMIN) 50 MG tablet Take 50 mg by mouth 2 (Two) Times a Day.   10/4/2018 at 0800   • atorvastatin (LIPITOR) 20 MG tablet Take 20 mg by mouth Daily.   10/4/2018 at 0800   • Cyanocobalamin (VITAMIN B-12 IJ) Inject  as directed.   Past Month at Unknown time   • docusate sodium (COLACE) 100 MG capsule Take 100 mg by mouth 2 (Two) Times a Day As Needed for constipation.   10/4/2018 at Unknown time   • furosemide (LASIX) 40 MG tablet Take 40 mg by mouth Daily As Needed.   Past Week at Unknown time   • levothyroxine (SYNTHROID, LEVOTHROID) 100 MCG tablet    10/4/2018 at 0800   • losartan (COZAAR) 100 MG tablet Take 100 mg by mouth Daily.   10/4/2018 at 0800   • metFORMIN (GLUCOPHAGE) 500 MG tablet Take 500 mg by mouth 2 (Two) Times a Day.   10/4/2018 at 0800       Current Meds:   Hospital Medications (active)       Dose Frequency Start End    amLODIPine (NORVASC) tablet 5 mg 5 mg Every 24 Hours Scheduled 10/5/2018     Sig - Route: Take 1 tablet by mouth Daily. - Oral    atenolol (TENORMIN) tablet 50 mg 50 mg 2 Times Daily 10/4/2018     Sig - Route: Take 1 tablet by mouth 2 (Two) Times a Day. - Oral    atorvastatin (LIPITOR) tablet 20 mg 20 mg Daily 10/5/2018     Sig - Route: Take 2 tablets by mouth Daily. - Oral    dextrose (D50W) 25 g/ 50mL Intravenous Solution 25 g 25 g Every 15 Minutes PRN 10/4/2018     Sig - Route: Infuse 50 mL into a venous catheter Every 15 (Fifteen) Minutes As Needed for Low Blood Sugar (Blood Sugar Less Than 70). - Intravenous    dextrose (GLUTOSE) oral gel 15 g 15 g Every 15 Minutes PRN 10/4/2018     Sig - Route: Take 15 g by mouth Every 15 (Fifteen) Minutes As Needed for Low Blood Sugar (Blood sugar less than 70). - Oral    glucagon (human recombinant) (GLUCAGEN DIAGNOSTIC) injection 1 mg 1 mg As Needed 10/4/2018     Sig - Route: Inject 1 mg under the skin  "into the appropriate area as directed As Needed (Blood Glucose Less Than 70). - Subcutaneous    HYDROmorphone (DILAUDID) 1 MG/ML injection  - ADS Override Pull   10/4/2018 10/4/2018    Notes to Pharmacy: Created by cabinet override    HYDROmorphone (DILAUDID) injection 0.5 mg 0.5 mg Every 2 Hours PRN 10/4/2018 10/14/2018    Sig - Route: Infuse 0.5 mL into a venous catheter Every 2 (Two) Hours As Needed for Severe Pain . - Intravenous    Linked Group 1:  \"And\" Linked Group Details        insulin lispro (humaLOG) injection 2-7 Units 2-7 Units 4 Times Daily With Meals & Nightly 10/5/2018     Sig - Route: Inject 2-7 Units under the skin into the appropriate area as directed 4 (Four) Times a Day With Meals & at Bedtime. - Subcutaneous    levothyroxine (SYNTHROID, LEVOTHROID) tablet 100 mcg 100 mcg Every Early Morning 10/5/2018     Sig - Route: Take 1 tablet by mouth Every Morning. - Oral    losartan (COZAAR) tablet 100 mg 100 mg Daily 10/5/2018     Sig - Route: Take 2 tablets by mouth Daily. - Oral    naloxone (NARCAN) injection 0.4 mg 0.4 mg Every 5 Minutes PRN 10/4/2018     Sig - Route: Infuse 1 mL into a venous catheter Every 5 (Five) Minutes As Needed for Respiratory Depression. - Intravenous    Linked Group 1:  \"And\" Linked Group Details        nitroglycerin (NITROSTAT) 0.4 MG SL tablet  - ADS Override Pull   10/4/2018 10/4/2018    Notes to Pharmacy: Created by cabinet override    ondansetron (ZOFRAN) injection 4 mg 4 mg Every 6 Hours PRN 10/4/2018     Sig - Route: Infuse 2 mL into a venous catheter Every 6 (Six) Hours As Needed for Nausea or Vomiting. - Intravenous    pantoprazole (PROTONIX) injection 40 mg 40 mg Every Early Morning 10/4/2018     Sig - Route: Infuse 10 mL into a venous catheter Every Morning. - Intravenous    promethazine (PHENERGAN) injection 12.5 mg 12.5 mg Once 10/4/2018 10/4/2018    Sig - Route: Infuse 0.5 mL into a venous catheter 1 (One) Time. - Intravenous    promethazine (PHENERGAN) " "injection 12.5 mg 12.5 mg Every 6 Hours PRN 10/4/2018     Sig - Route: Inject 0.5 mL into the appropriate muscle as directed by prescriber Every 6 (Six) Hours As Needed for Nausea or Vomiting. - Intramuscular    Linked Group 2:  \"Or\" Linked Group Details        promethazine (PHENERGAN) injection 12.5 mg 12.5 mg Every 6 Hours PRN 10/4/2018     Sig - Route: Infuse 0.5 mL into a venous catheter Every 6 (Six) Hours As Needed for Nausea or Vomiting. - Intravenous    Linked Group 2:  \"Or\" Linked Group Details        promethazine (PHENERGAN) suppository 12.5 mg 12.5 mg Every 6 Hours PRN 10/4/2018     Sig - Route: Insert 1 suppository into the rectum Every 6 (Six) Hours As Needed for Nausea or Vomiting. - Rectal    Linked Group 2:  \"Or\" Linked Group Details        promethazine (PHENERGAN) tablet 12.5 mg 12.5 mg Every 6 Hours PRN 10/4/2018     Sig - Route: Take 0.5 tablets by mouth Every 6 (Six) Hours As Needed for Nausea or Vomiting. - Oral    Linked Group 2:  \"Or\" Linked Group Details        sodium chloride 0.9 % flush 3 mL 3 mL Every 12 Hours Scheduled 10/4/2018     Sig - Route: Infuse 3 mL into a venous catheter Every 12 (Twelve) Hours. - Intravenous    sodium chloride 0.9 % flush 3-10 mL 3-10 mL As Needed 10/4/2018     Sig - Route: Infuse 3-10 mL into a venous catheter As Needed for Line Care. - Intravenous    sodium chloride 0.9 % infusion 100 mL/hr Continuous 10/4/2018     Sig - Route: Infuse 100 mL/hr into a venous catheter Continuous. - Intravenous          Allergies:  No Known Allergies    Review of Systems  Review of Systems   Constitutional: Negative for activity change, appetite change, chills, diaphoresis, fatigue, fever and unexpected weight change.   HENT: Positive for trouble swallowing. Negative for ear pain, hearing loss, mouth sores, sore throat and voice change.    Eyes: Negative.    Respiratory: Negative for cough, choking, shortness of breath and wheezing.    Cardiovascular: Positive for chest pain. " Negative for palpitations.   Gastrointestinal: Positive for nausea and vomiting. Negative for abdominal pain, blood in stool, constipation and diarrhea.   Endocrine: Negative for cold intolerance and heat intolerance.   Genitourinary: Negative for decreased urine volume, dysuria, frequency, hematuria and urgency.   Musculoskeletal: Negative for back pain, gait problem and myalgias.   Skin: Negative for color change, pallor and rash.   Allergic/Immunologic: Negative for food allergies and immunocompromised state.   Neurological: Negative for dizziness, tremors, seizures, syncope, weakness, light-headedness, numbness and headaches.   Hematological: Negative for adenopathy. Does not bruise/bleed easily.   Psychiatric/Behavioral: Negative for agitation and confusion. The patient is not nervous/anxious.    All other systems reviewed and are negative.       Objective     Vital Signs  Temp:  [97.3 °F (36.3 °C)-98.9 °F (37.2 °C)] 97.6 °F (36.4 °C)  Heart Rate:  [61-73] 61  Resp:  [18] 18  BP: (108-120)/(46-59) 117/59    Physical Exam:  General Appearance:    Alert, cooperative, in no acute distress   Head:    Normocephalic, without obvious abnormality, atraumatic   Eyes:            Lids and lashes normal, conjunctivae and sclerae normal, no   Icterus, conjunctival pallor   Throat:   No oral lesions, no thrush, oral mucosa moist, posterior oropharynx clear   Neck:   No adenopathy, supple, trachea midline, no thyromegaly, no   carotid bruit, no JVD   Lungs:     Clear to auscultation,respirations regular, even and                   unlabored    Heart:    Regular rhythm and normal rate, normal S1 and S2, no            murmur   Chest Wall:    No abnormalities observed   Abdomen:     Normal bowel sounds, no masses, no organomegaly, soft        non-tender, non-distended, no guarding, no rebound                 tenderness   Rectal:     Deferred   Extremities:   no edema, no cyanosis   Skin:   No open lesions, bruising or rash    Lymph nodes:   No palpable cervical adenopathy   Psychiatric:  Judgement and insight: normal   Orientation to person place and time: normal   Mood and affect: normal     Results Review:    Results from last 7 days  Lab Units 10/05/18  0503   WBC 10*3/mm3 5.60   HEMOGLOBIN g/dL 11.2*   HEMATOCRIT % 33.2*   PLATELETS 10*3/mm3 204         Results from last 7 days  Lab Units 10/05/18  0503   SODIUM mmol/L 139   POTASSIUM mmol/L 4.2   CHLORIDE mmol/L 107   CO2 mmol/L 24.0   BUN mg/dL 14   CREATININE mg/dL 0.95   CALCIUM mg/dL 8.4   GLUCOSE mg/dL 119*              Radiology Review:  Imaging Results (last 72 hours)     ** No results found for the last 72 hours. **          Assessment/Plan     Active Problems:    Atypical chest pain      1. Midsternal chest pain with painful swallowing  2. Nausea/vomiting  3. DM  4. HTN  5. Hx hyperplastic polyp    Endoscopy today all risks, benefits, indications, and alternatives discussed she is agreeable. Initiate PPI. She is due for a colonoscopy at some point can do this outpatient with her primary GI Dr Yang as outpatient.     EMR Dragon/transcription disclaimer: Much of this encounter note is electronic transcription/translation of spoken language to printed text. The electronic translation of spoken language may be erroneous, or at times, nonsensical words or phrases may be inadvertently transcribed. Although I have reviewed the note for such errors, some may still exist.  MARSHALL Cabrales  [facility] Gastroenterology  10/05/18  8:51 AM        I performed a history, physical examination, reviewed the progress note/consult note, and discussed the management of this patient with MARSHALL Paulson as her supervising physician.  I agree with the documented findings and plan of care as outlined with any exceptions or new recommendations noted below.    Proceed with endoscopy    The following major R/B/A were discussed with the patient, however the  list is not all inclusive . Risk:  Bleeding (immediate and delayed), perforation (rupture or tear), reaction to medication, missed lesion/cancer, pain during the procedure, infection, need for surgery, need for ostomy, need for mechanical ventilation (breathing machine), death.  Benefits: removal of polyp/tissue, burn/clip/or inject to stop bleeding, removal of foreign body, dilate any stricture.  Alternatives: Xray or CT, surgery, do nothing with associated risk   The patient was given time to ask question and received explanation, and agrees to proceed as per History and Physical.   No guarantee given or expressed.       EMR Dragon/transcription disclaimer: Much of this encounter note is an electronic transcription/translation of spoken language to printed text.  The electronic translation of spoken language may permit erroneous, or at times, nonsensical words or phrases to be inadvertently transcribed.  Although I have reviewed the note for such errors, some may still exist.      Ishmael Enriquez MD  9:49 AM  10/5/2018

## 2018-10-05 NOTE — NURSING NOTE
Pt awake and alert. A1C is 12.2% this admit.  Tells me she is taking Trulicity. Took it Thurs.  States she can't even swallow broth without pain.  States she had been on Insulin before also.  She did not understand the difference of A1C and blood sugar results. Very argumentative and I doubt she listened.  Time left for questions.

## 2018-10-05 NOTE — ANESTHESIA POSTPROCEDURE EVALUATION
Patient: Kika Morocho    Procedure Summary     Date:  10/05/18 Room / Location:  [facility] ENDOSCOPY 4 / [facility] END[facility]    Anesthesia Start:  0944 Anesthesia Stop:  1004    Procedure:  ESOPHAGOGASTRODUODENOSCOPY WITH ANESTHESIA (N/A ) Diagnosis:      Surgeon:  Ishmael Enriquez MD Provider:  James Alatorre CRNA    Anesthesia Type:  general ASA Status:  3          Anesthesia Type: general  Last vitals  BP   111/65 (10/05/18 1003)   Temp   97.6 °F (36.4 °C) (10/05/18 0718)   Pulse   71 (10/05/18 1003)   Resp   14 (10/05/18 1003)     SpO2   98 % (10/05/18 1003)     Post Anesthesia Care and Evaluation    Patient location during evaluation: PHASE II  Patient participation: complete - patient participated  Level of consciousness: awake  Pain management: adequate  Airway patency: patent  Anesthetic complications: No anesthetic complications  Respiratory status: acceptable  Hydration status: acceptable

## 2018-10-05 NOTE — PLAN OF CARE
Problem: Patient Care Overview  Goal: Plan of Care Review  Outcome: Ongoing (interventions implemented as appropriate)   10/05/18 0436 10/05/18 0450   Coping/Psychosocial   Plan of Care Reviewed With --  patient   Plan of Care Review   Progress --  no change   OTHER   Outcome Summary Direct admit by Dr. Forde from [place] for throat and abd pain/tightness. Seen by MARSHALL Gamboa upon arrival. Pt c/o tightness and squeezing in back of throat, epigastric area and chest; prn pain meds given. C/o nausea; medicated with prn meds. Troponin ran, came back <0.012. IVF intiated. IV protonix given. Pt NPO except ice chips. VSS. Will cont to monitor.  --

## 2018-10-05 NOTE — PLAN OF CARE
"Problem: Patient Care Overview  Goal: Plan of Care Review  Outcome: Ongoing (interventions implemented as appropriate)   10/05/18 1532   Coping/Psychosocial   Plan of Care Reviewed With patient   Plan of Care Review   Progress no change   OTHER   Outcome Summary Endo today showed esophagitis, biopsies taken, nystatin swish and swallow ordered. Advanced to clear liquids, has been unable to tolerate. Pt states, \"It hurts my throat. It feels like my I need to burp in my stomach.\" Diabetes educator ordered for A1C: 12.2. Pt became confrontational with educator and couldn't understand the meaning of an A1C. Education provided and followup with PCP encouraged following discharge. Tele: normal sinus rhythm. IVF. VSS. Voiding. Will continue to monitor.,       Problem: Pain, Acute (Adult)  Goal: Identify Related Risk Factors and Signs and Symptoms  Outcome: Ongoing (interventions implemented as appropriate)   10/05/18 1532   Pain, Acute (Adult)   Related Risk Factors (Acute Pain) persistent pain   Signs and Symptoms (Acute Pain) verbalization of pain descriptors       Problem: Nausea/Vomiting (Adult)  Goal: Identify Related Risk Factors and Signs and Symptoms  Outcome: Ongoing (interventions implemented as appropriate)   10/05/18 1532   Nausea/Vomiting (Adult)   Related Risk Factors (Nausea/Vomiting) gastric irritation   Signs and Symptoms (Nausea/Vomiting) abdominal discomfort/pain;food aversion     Goal: Symptom Relief   10/05/18 1532   Nausea/Vomiting (Adult)   Symptom Relief making progress toward outcome       Problem: Diabetes, Type 2 (Adult)  Goal: Signs and Symptoms of Listed Potential Problems Will be Absent, Minimized or Managed (Diabetes, Type 2)  Outcome: Ongoing (interventions implemented as appropriate)   10/05/18 1532   Goal/Outcome Evaluation   Problems Assessed (Type 2 Diabetes) all   Problems Present (Type 2 Diabetes) hyperglycemia         "

## 2018-10-05 NOTE — ANESTHESIA PREPROCEDURE EVALUATION
Anesthesia Evaluation     Patient summary reviewed   no history of anesthetic complications:  NPO Solid Status: > 8 hours  NPO Liquid Status: > 8 hours           Airway   Mallampati: I  TM distance: >3 FB  Neck ROM: full  Dental    (+) edentulous, upper dentures and lower dentures    Pulmonary - normal exam    breath sounds clear to auscultation  (+) a smoker (0.5 ppd) Current,   (-) COPD, asthma, recent URI, sleep apnea  Cardiovascular - normal exam  Exercise tolerance: good (4-7 METS)    Patient on routine beta blocker and Beta blocker given within 24 hours of surgery  Rhythm: regular  Rate: normal    (+) hypertension well controlled, hyperlipidemia,   (-) pacemaker, past MI, angina, cardiac stents, CABG      Neuro/Psych  (-) seizures, TIA, CVA  GI/Hepatic/Renal/Endo    (+)  GERD well controlled,  diabetes mellitus, hypothyroidism,   (-) liver disease, no renal disease, hyperthyroidism    ROS Comment: Gastric bypass in 1981    Musculoskeletal     Abdominal    Substance History   (-) alcohol use     OB/GYN          Other        ROS/Med Hx Other: History of esophageal stricture                Anesthesia Plan    ASA 3     general   total IV anesthesia(Admitted with esophageal pain with swallowing, dysphagia)  intravenous induction   Anesthetic plan, all risks, benefits, and alternatives have been provided, discussed and informed consent has been obtained with: patient.

## 2018-10-06 LAB
ANION GAP SERPL CALCULATED.3IONS-SCNC: 10 MMOL/L (ref 4–13)
BASOPHILS # BLD AUTO: 0.04 10*3/MM3 (ref 0–0.2)
BASOPHILS NFR BLD AUTO: 0.8 % (ref 0–2)
BUN BLD-MCNC: 10 MG/DL (ref 5–21)
BUN/CREAT SERPL: 10.8 (ref 7–25)
CALCIUM SPEC-SCNC: 8.6 MG/DL (ref 8.4–10.4)
CHLORIDE SERPL-SCNC: 106 MMOL/L (ref 98–110)
CO2 SERPL-SCNC: 23 MMOL/L (ref 24–31)
CREAT BLD-MCNC: 0.93 MG/DL (ref 0.5–1.4)
DEPRECATED RDW RBC AUTO: 50 FL (ref 40–54)
EOSINOPHIL # BLD AUTO: 0.08 10*3/MM3 (ref 0–0.7)
EOSINOPHIL NFR BLD AUTO: 1.5 % (ref 0–4)
ERYTHROCYTE [DISTWIDTH] IN BLOOD BY AUTOMATED COUNT: 14.5 % (ref 12–15)
GFR SERPL CREATININE-BSD FRML MDRD: 60 ML/MIN/1.73
GLUCOSE BLD-MCNC: 102 MG/DL (ref 70–100)
GLUCOSE BLDC GLUCOMTR-MCNC: 105 MG/DL (ref 70–130)
GLUCOSE BLDC GLUCOMTR-MCNC: 108 MG/DL (ref 70–130)
GLUCOSE BLDC GLUCOMTR-MCNC: 119 MG/DL (ref 70–130)
GLUCOSE BLDC GLUCOMTR-MCNC: 88 MG/DL (ref 70–130)
HCT VFR BLD AUTO: 34.5 % (ref 37–47)
HGB BLD-MCNC: 11.3 G/DL (ref 12–16)
IMM GRANULOCYTES # BLD: 0.01 10*3/MM3 (ref 0–0.03)
IMM GRANULOCYTES NFR BLD: 0.2 % (ref 0–5)
LYMPHOCYTES # BLD AUTO: 1.46 10*3/MM3 (ref 0.72–4.86)
LYMPHOCYTES NFR BLD AUTO: 28 % (ref 15–45)
MCH RBC QN AUTO: 30.5 PG (ref 28–32)
MCHC RBC AUTO-ENTMCNC: 32.8 G/DL (ref 33–36)
MCV RBC AUTO: 93.2 FL (ref 82–98)
MONOCYTES # BLD AUTO: 0.61 10*3/MM3 (ref 0.19–1.3)
MONOCYTES NFR BLD AUTO: 11.7 % (ref 4–12)
NEUTROPHILS # BLD AUTO: 3.02 10*3/MM3 (ref 1.87–8.4)
NEUTROPHILS NFR BLD AUTO: 57.8 % (ref 39–78)
NRBC BLD MANUAL-RTO: 0 /100 WBC (ref 0–0)
PLATELET # BLD AUTO: 199 10*3/MM3 (ref 130–400)
PMV BLD AUTO: 11.4 FL (ref 6–12)
POTASSIUM BLD-SCNC: 4.5 MMOL/L (ref 3.5–5.3)
RBC # BLD AUTO: 3.7 10*6/MM3 (ref 4.2–5.4)
SODIUM BLD-SCNC: 139 MMOL/L (ref 135–145)
WBC NRBC COR # BLD: 5.22 10*3/MM3 (ref 4.8–10.8)

## 2018-10-06 PROCEDURE — 99232 SBSQ HOSP IP/OBS MODERATE 35: CPT | Performed by: INTERNAL MEDICINE

## 2018-10-06 PROCEDURE — 63710000001 PROMETHAZINE PER 25 MG: Performed by: NURSE PRACTITIONER

## 2018-10-06 PROCEDURE — 82962 GLUCOSE BLOOD TEST: CPT

## 2018-10-06 PROCEDURE — 85025 COMPLETE CBC W/AUTO DIFF WBC: CPT | Performed by: NURSE PRACTITIONER

## 2018-10-06 PROCEDURE — 80048 BASIC METABOLIC PNL TOTAL CA: CPT | Performed by: NURSE PRACTITIONER

## 2018-10-06 PROCEDURE — 25010000002 HYDROMORPHONE PER 4 MG: Performed by: NURSE PRACTITIONER

## 2018-10-06 PROCEDURE — 25010000002 ONDANSETRON PER 1 MG: Performed by: NURSE PRACTITIONER

## 2018-10-06 RX ORDER — HYDROCODONE BITARTRATE AND ACETAMINOPHEN 7.5; 325 MG/1; MG/1
1 TABLET ORAL EVERY 4 HOURS PRN
Status: DISCONTINUED | OUTPATIENT
Start: 2018-10-06 | End: 2018-10-07 | Stop reason: HOSPADM

## 2018-10-06 RX ADMIN — LOSARTAN POTASSIUM 100 MG: 50 TABLET ORAL at 09:00

## 2018-10-06 RX ADMIN — SUCRALFATE 1 G: 1 SUSPENSION ORAL at 12:45

## 2018-10-06 RX ADMIN — NYSTATIN 500000 UNITS: 500000 SUSPENSION ORAL at 21:53

## 2018-10-06 RX ADMIN — Medication 3 ML: at 21:53

## 2018-10-06 RX ADMIN — SODIUM CHLORIDE 100 ML/HR: 9 INJECTION, SOLUTION INTRAVENOUS at 06:34

## 2018-10-06 RX ADMIN — ONDANSETRON 4 MG: 2 INJECTION INTRAMUSCULAR; INTRAVENOUS at 14:59

## 2018-10-06 RX ADMIN — HYDROMORPHONE HYDROCHLORIDE 0.5 MG: 1 INJECTION, SOLUTION INTRAMUSCULAR; INTRAVENOUS; SUBCUTANEOUS at 01:58

## 2018-10-06 RX ADMIN — SUCRALFATE 1 G: 1 SUSPENSION ORAL at 17:14

## 2018-10-06 RX ADMIN — HYDROMORPHONE HYDROCHLORIDE 0.5 MG: 1 INJECTION, SOLUTION INTRAMUSCULAR; INTRAVENOUS; SUBCUTANEOUS at 05:46

## 2018-10-06 RX ADMIN — NYSTATIN 500000 UNITS: 500000 SUSPENSION ORAL at 17:14

## 2018-10-06 RX ADMIN — HYDROCODONE BITARTRATE AND ACETAMINOPHEN 1 TABLET: 7.5; 325 TABLET ORAL at 14:59

## 2018-10-06 RX ADMIN — ONDANSETRON 4 MG: 2 INJECTION INTRAMUSCULAR; INTRAVENOUS at 04:49

## 2018-10-06 RX ADMIN — ATENOLOL 50 MG: 50 TABLET ORAL at 21:53

## 2018-10-06 RX ADMIN — HYDROMORPHONE HYDROCHLORIDE 0.5 MG: 1 INJECTION, SOLUTION INTRAMUSCULAR; INTRAVENOUS; SUBCUTANEOUS at 10:22

## 2018-10-06 RX ADMIN — ATORVASTATIN CALCIUM 20 MG: 10 TABLET, FILM COATED ORAL at 08:59

## 2018-10-06 RX ADMIN — ATENOLOL 50 MG: 50 TABLET ORAL at 08:59

## 2018-10-06 RX ADMIN — NYSTATIN 500000 UNITS: 500000 SUSPENSION ORAL at 12:45

## 2018-10-06 RX ADMIN — NYSTATIN 500000 UNITS: 500000 SUSPENSION ORAL at 09:00

## 2018-10-06 RX ADMIN — SUCRALFATE 1 G: 1 SUSPENSION ORAL at 05:45

## 2018-10-06 RX ADMIN — PROMETHAZINE HYDROCHLORIDE 12.5 MG: 25 TABLET ORAL at 10:22

## 2018-10-06 RX ADMIN — LEVOTHYROXINE SODIUM 100 MCG: 0.1 TABLET ORAL at 05:46

## 2018-10-06 RX ADMIN — PANTOPRAZOLE SODIUM 40 MG: 40 INJECTION, POWDER, FOR SOLUTION INTRAVENOUS at 05:45

## 2018-10-06 RX ADMIN — AMLODIPINE BESYLATE 5 MG: 5 TABLET ORAL at 08:59

## 2018-10-06 NOTE — PLAN OF CARE
Problem: Patient Care Overview  Goal: Plan of Care Review  Outcome: Ongoing (interventions implemented as appropriate)   10/06/18 0119 10/06/18 0815 10/06/18 1415   Coping/Psychosocial   Plan of Care Reviewed With --  patient --    Plan of Care Review   Progress no change --  --    OTHER   Outcome Summary --  --  Pt cont to c/o esophageal pain/burning. Scheduled meds given. C/o nausea and pain x1. IID. D/C tele. Pt ambulating. Tolerating soft diet today. Cont to monitor      Goal: Individualization and Mutuality  Outcome: Ongoing (interventions implemented as appropriate)   10/05/18 0436 10/06/18 0119   Individualization   Patient Specific Preferences Pt prefers lights out, door closed --    Patient Specific Goals (Include Timeframe) --  Keep pain at tolerable level; decreased indigestion    Patient Specific Interventions --  Prn pain meds, carafate, protonix    Mutuality/Individual Preferences   What Anxieties, Fears, Concerns, or Questions Do You Have About Your Care? none at this time --    What Information Would Help Us Give You More Personalized Care? none at this time --    How Would You and/or Your Support Person Like to Participate in Your Care? none at this time --      Goal: Discharge Needs Assessment  Outcome: Ongoing (interventions implemented as appropriate)   10/04/18 2125 10/05/18 0436   Discharge Needs Assessment   Readmission Within the Last 30 Days --  no previous admission in last 30 days   Patient/Family Anticipates Transition to home with family --    Patient/Family Anticipated Services at Transition none --    Transportation Anticipated car, drives self;family or friend will provide --    Disability   Equipment Currently Used at Home none --        Problem: Pain, Acute (Adult)  Goal: Identify Related Risk Factors and Signs and Symptoms  Outcome: Ongoing (interventions implemented as appropriate)   10/05/18 1532   Pain, Acute (Adult)   Related Risk Factors (Acute Pain) persistent pain   Signs  and Symptoms (Acute Pain) verbalization of pain descriptors     Goal: Acceptable Pain Control/Comfort Level  Outcome: Ongoing (interventions implemented as appropriate)   10/06/18 0119   Pain, Acute (Adult)   Acceptable Pain Control/Comfort Level making progress toward outcome       Problem: Nausea/Vomiting (Adult)  Goal: Identify Related Risk Factors and Signs and Symptoms  Outcome: Ongoing (interventions implemented as appropriate)   10/05/18 1532   Nausea/Vomiting (Adult)   Related Risk Factors (Nausea/Vomiting) gastric irritation   Signs and Symptoms (Nausea/Vomiting) abdominal discomfort/pain;food aversion     Goal: Symptom Relief  Outcome: Ongoing (interventions implemented as appropriate)   10/06/18 0119   Nausea/Vomiting (Adult)   Symptom Relief making progress toward outcome     Goal: Adequate Hydration  Outcome: Ongoing (interventions implemented as appropriate)   10/06/18 0119   Nausea/Vomiting (Adult)   Adequate Hydration making progress toward outcome       Problem: Diabetes, Type 2 (Adult)  Goal: Signs and Symptoms of Listed Potential Problems Will be Absent, Minimized or Managed (Diabetes, Type 2)  Outcome: Ongoing (interventions implemented as appropriate)   10/05/18 1537   Goal/Outcome Evaluation   Problems Assessed (Type 2 Diabetes) hyperglycemia;hypoglycemia   Problems Present (Type 2 Diabetes) hyperglycemia;other (see comments)

## 2018-10-06 NOTE — PROGRESS NOTES
[facility] Medicine Services  INPATIENT PROGRESS NOTE    Patient Name: Kika Morocho  Date of Admission: 10/4/2018  Today's Date: 10/06/18  Length of Stay: 0  Primary Care Physician: Zelda Hahn APRN    Subjective   Chief Complaint: follow up esophagitis  HPI   Pt is awake and sitting on the side of the bed. States overall she feels better. No vomiting.     Review of Systems   All pertinent negatives and positives are as above. All other systems have been reviewed and are negative unless otherwise stated.     Objective    Temp:  [98 °F (36.7 °C)-98.7 °F (37.1 °C)] 98.2 °F (36.8 °C)  Heart Rate:  [58-71] 67  Resp:  [14-18] 16  BP: (106-142)/(46-66) 128/66  Physical Exam   Constitutional: She is oriented to person, place, and time. She appears well-developed and well-nourished.   HENT:   Head: Normocephalic and atraumatic.   Eyes: Pupils are equal, round, and reactive to light. Conjunctivae and EOM are normal.   Neck: Neck supple. No JVD present. No thyromegaly present.   Cardiovascular: Normal rate, regular rhythm, normal heart sounds and intact distal pulses.  Exam reveals no gallop and no friction rub.    No murmur heard.  Sinus bradycardia 58-77   Pulmonary/Chest: Effort normal and breath sounds normal. No respiratory distress. She has no wheezes. She has no rales. She exhibits no tenderness.   Abdominal: Soft. Bowel sounds are normal. She exhibits no distension. There is no tenderness. There is no rebound and no guarding.   Musculoskeletal: Normal range of motion. She exhibits no edema, tenderness or deformity.   Lymphadenopathy:     She has no cervical adenopathy.   Neurological: She is alert and oriented to person, place, and time. She displays normal reflexes. No cranial nerve deficit. She exhibits normal muscle tone.   Skin: Skin is warm and dry. No rash noted.   Psychiatric: She has a normal mood and affect. Her behavior is normal. Judgment and thought content  normal.     Results Review:  I have reviewed the labs, radiology results, and diagnostic studies.    Laboratory Data:     Results from last 7 days  Lab Units 10/06/18  0449 10/05/18  0503   WBC 10*3/mm3 5.22 5.60   HEMOGLOBIN g/dL 11.3* 11.2*   HEMATOCRIT % 34.5* 33.2*   PLATELETS 10*3/mm3 199 204          Results from last 7 days  Lab Units 10/06/18  0449 10/05/18  0503   SODIUM mmol/L 139 139   POTASSIUM mmol/L 4.5 4.2   CHLORIDE mmol/L 106 107   CO2 mmol/L 23.0* 24.0   BUN mg/dL 10 14   CREATININE mg/dL 0.93 0.95   CALCIUM mg/dL 8.6 8.4   GLUCOSE mg/dL 102* 119*     Radiology Data:   Imaging Results (last 24 hours)     ** No results found for the last 24 hours. **          I have reviewed the patient's current medications.     Assessment/Plan   Assessment:  1. Atypical chest pain, secondary to severe esophagitis  2. Nausea with vomiting  3. Odynophagia   4. Diabetes mellitus type II-uncontrolled with hyperglycemia-A1C 12.2%  5. Hypothyroidism  6. Hypertension  7. Gastroesophageal reflux disease  8. History of gastric bypass in the remote past     Plan:  1. Protonix bid IV for now.   2. Discontinue IV fluids.   3. Change pain medications to oral.   4. Discontinue telemetry.   5. Gi has advanced to full liquids.   6. Does not need to take any additional trulicity. It slows the gut motility which will aggravate her symptoms.     Discharge Planning: I expect the patient to be discharged to home in 1 days.    MARSHALL Sanford   10/06/18   9:47 AM     I personally evaluated and examined the patient in conjunction with MARSHALL Reyes and agree with the assessment, treatment plan, and disposition of the patient as recorded by her. My history, exam, and further recommendations are: Agree the Trulicity is not a desired medication for this patient as it causes delayed gastric emptying.  Her current blood sugar trend looks great: 105, 12, 115, 104, 115.  Diabetic education has been performed.  Agree with the  assessment and plan as outlined above, and appreciate gastroenterology assistance.        Matthias Worley MD  10/06/18  11:28 AM

## 2018-10-06 NOTE — PROGRESS NOTES
CC:  Esophagitis    Subjective:   Feeling a bit better today.  Able to tolerate clear liquids.      Current Facility-Administered Medications:   •  amLODIPine (NORVASC) tablet 5 mg, 5 mg, Oral, Q24H, Ruth Worley, APRN, 5 mg at 10/05/18 0802  •  atenolol (TENORMIN) tablet 50 mg, 50 mg, Oral, BID, Ruth Worley, APRN, 50 mg at 10/05/18 0803  •  atorvastatin (LIPITOR) tablet 20 mg, 20 mg, Oral, Daily, Ruth Worley, APRN, 20 mg at 10/05/18 0802  •  dextrose (D50W) 25 g/ 50mL Intravenous Solution 25 g, 25 g, Intravenous, Q15 Min PRN, Ruth Worley, APRN  •  dextrose (GLUTOSE) oral gel 15 g, 15 g, Oral, Q15 Min PRN, Ruth Worley, APRN  •  glucagon (human recombinant) (GLUCAGEN DIAGNOSTIC) injection 1 mg, 1 mg, Subcutaneous, PRN, Ruth Worley, APRN  •  HYDROmorphone (DILAUDID) injection 0.5 mg, 0.5 mg, Intravenous, Q2H PRN, 0.5 mg at 10/06/18 0546 **AND** naloxone (NARCAN) injection 0.4 mg, 0.4 mg, Intravenous, Q5 Min PRN, Ruth Worley, APRN  •  insulin lispro (humaLOG) injection 2-7 Units, 2-7 Units, Subcutaneous, 4x Daily With Meals & Nightly, Ruth Worley, APRN  •  levothyroxine (SYNTHROID, LEVOTHROID) tablet 100 mcg, 100 mcg, Oral, Q AM, Ruth Worley, APRN, 100 mcg at 10/06/18 0546  •  losartan (COZAAR) tablet 100 mg, 100 mg, Oral, Daily, Ruth Worley APRN, 100 mg at 10/05/18 0802  •  nystatin (MYCOSTATIN) 660985 UNIT/ML suspension 500,000 Units, 5 mL, Swish & Swallow, 4x Daily, Ishmael Enriquez MD, 500,000 Units at 10/05/18 2020  •  ondansetron (ZOFRAN) injection 4 mg, 4 mg, Intravenous, Q6H PRN, Ruth Worley, APRN, 4 mg at 10/06/18 0449  •  pantoprazole (PROTONIX) injection 40 mg, 40 mg, Intravenous, Q AM, Ruth Worley APRN, 40 mg at 10/06/18 0545  •  promethazine (PHENERGAN) tablet 12.5 mg, 12.5 mg, Oral, Q6H PRN **OR** promethazine (PHENERGAN) injection 12.5 mg, 12.5 mg, Intramuscular, Q6H PRN **OR** promethazine (PHENERGAN) injection 12.5 mg, 12.5 mg,  Intravenous, Q6H PRN **OR** promethazine (PHENERGAN) suppository 12.5 mg, 12.5 mg, Rectal, Q6H PRN, Ruth Worley APRN  •  sodium chloride 0.9 % flush 3 mL, 3 mL, Intravenous, Q12H, Ruth Worley APRN, 3 mL at 10/05/18 0803  •  sodium chloride 0.9 % flush 3-10 mL, 3-10 mL, Intravenous, PRN, Ruth Worley APRN  •  sodium chloride 0.9 % infusion, 100 mL/hr, Intravenous, Continuous, Kel Sam MD, Last Rate: 100 mL/hr at 10/06/18 0634, 100 mL/hr at 10/06/18 0634  •  sucralfate (CARAFATE) 1 GM/10ML suspension 1 g, 1 g, Oral, Q6H, Matthias Worley MD, 1 g at 10/06/18 0545    Review of Systems   Respiratory: Negative.    Cardiovascular: Negative.    Gastrointestinal: Negative for abdominal distention and abdominal pain.         Vital Signs:  Temp:  [98 °F (36.7 °C)-98.7 °F (37.1 °C)] 98.2 °F (36.8 °C)  Heart Rate:  [58-71] 67  Resp:  [14-18] 16  BP: (106-142)/(46-66) 128/66  Body mass index is 32.72 kg/m².     Physical Exam   Cardiovascular: Normal rate.    Pulmonary/Chest: Effort normal.   Abdominal: Soft. Bowel sounds are normal.   Neurological: She is alert.        Results Review:     LABS:     Results from last 7 days  Lab Units 10/06/18  0449 10/05/18  0503   WBC 10*3/mm3 5.22 5.60   HEMOGLOBIN g/dL 11.3* 11.2*   HEMATOCRIT % 34.5* 33.2*   PLATELETS 10*3/mm3 199 204         Results from last 7 days  Lab Units 10/06/18  0449 10/05/18  0503   SODIUM mmol/L 139 139   POTASSIUM mmol/L 4.5 4.2   CHLORIDE mmol/L 106 107   CO2 mmol/L 23.0* 24.0   BUN mg/dL 10 14   CREATININE mg/dL 0.93 0.95   CALCIUM mg/dL 8.6 8.4   GLUCOSE mg/dL 102* 119*             No results found for: LIPASE    Radiology:    Imaging Results (last 24 hours)     ** No results found for the last 24 hours. **          Impression/Plan:    Patient Active Problem List   Diagnosis   • Tinnitus   • Hearing loss, sensorineural   • Atypical chest pain       Esophagitis.  She will need several weeks of proton pump inhibitor followed  by repeat endoscopy.  We will advance to full liquids today.  If she tolerates these can try soft consistency although she will need to be very careful about what she eats for fear of them impaction in the distal esophagus until she has been on medication to allow the esophagitis to resolve    EMR Dragon/transcription disclaimer: Much of this encounter note is electronic transcription/translation of spoken language to printed text.  The electronic translation of spoken language may permit erroneous, or at times, nonsensical words or phrases to be inadvertently transcribed.  Although I have reviewed the note for such errors, some may still exist.      Ishmael Enriquez MD  10/06/18  8:40 AM

## 2018-10-06 NOTE — PLAN OF CARE
Problem: Patient Care Overview  Goal: Plan of Care Review  Outcome: Ongoing (interventions implemented as appropriate)   10/06/18 0119   Coping/Psychosocial   Plan of Care Reviewed With patient   Plan of Care Review   Progress no change   OTHER   Outcome Summary Pt has c/o esophageal pain; medicated with prn pain meds. No c/o nausea this shift. Pt maintained on clear liquid diet; recently tolerated some broth without feeling sickness. Ambulated in hameed at beginning of shift. On tele running normal sinus. IVF cont. VSS. Will cont to monitor.      Goal: Individualization and Mutuality  Outcome: Ongoing (interventions implemented as appropriate)   10/05/18 0436 10/06/18 0119   Individualization   Patient Specific Preferences Pt prefers lights out, door closed --    Patient Specific Goals (Include Timeframe) --  Keep pain at tolerable level; decreased indigestion    Patient Specific Interventions --  Prn pain meds, carafate, protonix      Goal: Discharge Needs Assessment  Outcome: Ongoing (interventions implemented as appropriate)   10/04/18 2125 10/05/18 0436   Discharge Needs Assessment   Readmission Within the Last 30 Days --  no previous admission in last 30 days   Patient/Family Anticipates Transition to home with family --    Patient/Family Anticipated Services at Transition none --    Transportation Anticipated car, drives self;family or friend will provide --    Disability   Equipment Currently Used at Home none --      Goal: Interprofessional Rounds/Family Conf  Outcome: Ongoing (interventions implemented as appropriate)   10/05/18 0436   Interdisciplinary Rounds/Family Conf   Participants nursing;patient       Problem: Pain, Acute (Adult)  Goal: Identify Related Risk Factors and Signs and Symptoms  Outcome: Ongoing (interventions implemented as appropriate)   10/05/18 1532   Pain, Acute (Adult)   Related Risk Factors (Acute Pain) persistent pain   Signs and Symptoms (Acute Pain) verbalization of pain  descriptors     Goal: Acceptable Pain Control/Comfort Level  Outcome: Ongoing (interventions implemented as appropriate)   10/06/18 0119   Pain, Acute (Adult)   Acceptable Pain Control/Comfort Level making progress toward outcome       Problem: Nausea/Vomiting (Adult)  Goal: Identify Related Risk Factors and Signs and Symptoms  Outcome: Ongoing (interventions implemented as appropriate)   10/05/18 1532   Nausea/Vomiting (Adult)   Related Risk Factors (Nausea/Vomiting) gastric irritation   Signs and Symptoms (Nausea/Vomiting) abdominal discomfort/pain;food aversion     Goal: Symptom Relief  Outcome: Ongoing (interventions implemented as appropriate)   10/06/18 0119   Nausea/Vomiting (Adult)   Symptom Relief making progress toward outcome     Goal: Adequate Hydration  Outcome: Ongoing (interventions implemented as appropriate)   10/06/18 0119   Nausea/Vomiting (Adult)   Adequate Hydration making progress toward outcome       Problem: Diabetes, Type 2 (Adult)  Goal: Signs and Symptoms of Listed Potential Problems Will be Absent, Minimized or Managed (Diabetes, Type 2)  Outcome: Ongoing (interventions implemented as appropriate)   10/05/18 1537   Goal/Outcome Evaluation   Problems Assessed (Type 2 Diabetes) hyperglycemia;hypoglycemia   Problems Present (Type 2 Diabetes) hyperglycemia;other (see comments)

## 2018-10-07 VITALS
BODY MASS INDEX: 32.54 KG/M2 | TEMPERATURE: 98.6 F | RESPIRATION RATE: 16 BRPM | DIASTOLIC BLOOD PRESSURE: 64 MMHG | HEART RATE: 66 BPM | OXYGEN SATURATION: 97 % | SYSTOLIC BLOOD PRESSURE: 137 MMHG | HEIGHT: 64 IN | WEIGHT: 190.6 LBS

## 2018-10-07 LAB — GLUCOSE BLDC GLUCOMTR-MCNC: 117 MG/DL (ref 70–130)

## 2018-10-07 PROCEDURE — 99232 SBSQ HOSP IP/OBS MODERATE 35: CPT | Performed by: INTERNAL MEDICINE

## 2018-10-07 PROCEDURE — 63710000001 PROMETHAZINE PER 25 MG: Performed by: NURSE PRACTITIONER

## 2018-10-07 PROCEDURE — 82962 GLUCOSE BLOOD TEST: CPT

## 2018-10-07 PROCEDURE — 25010000002 ONDANSETRON PER 1 MG: Performed by: NURSE PRACTITIONER

## 2018-10-07 RX ORDER — HYDROCODONE BITARTRATE AND ACETAMINOPHEN 7.5; 325 MG/1; MG/1
1 TABLET ORAL EVERY 6 HOURS PRN
Qty: 12 TABLET | Refills: 0 | Status: SHIPPED | OUTPATIENT
Start: 2018-10-07 | End: 2018-10-16

## 2018-10-07 RX ORDER — OMEPRAZOLE 40 MG/1
40 CAPSULE, DELAYED RELEASE ORAL 2 TIMES DAILY
Qty: 60 CAPSULE | Refills: 1 | Status: SHIPPED | OUTPATIENT
Start: 2018-10-07 | End: 2018-11-14 | Stop reason: SDUPTHER

## 2018-10-07 RX ORDER — ONDANSETRON 4 MG/1
4 TABLET, FILM COATED ORAL EVERY 8 HOURS PRN
Qty: 15 TABLET | Refills: 0 | Status: SHIPPED | OUTPATIENT
Start: 2018-10-07

## 2018-10-07 RX ORDER — SUCRALFATE 1 G/1
1 TABLET ORAL 4 TIMES DAILY
Qty: 120 TABLET | Refills: 0 | Status: SHIPPED | OUTPATIENT
Start: 2018-10-07

## 2018-10-07 RX ADMIN — AMLODIPINE BESYLATE 5 MG: 5 TABLET ORAL at 08:28

## 2018-10-07 RX ADMIN — LEVOTHYROXINE SODIUM 100 MCG: 0.1 TABLET ORAL at 06:36

## 2018-10-07 RX ADMIN — PROMETHAZINE HYDROCHLORIDE 12.5 MG: 25 TABLET ORAL at 03:04

## 2018-10-07 RX ADMIN — LOSARTAN POTASSIUM 100 MG: 50 TABLET ORAL at 08:28

## 2018-10-07 RX ADMIN — NYSTATIN 500000 UNITS: 500000 SUSPENSION ORAL at 07:44

## 2018-10-07 RX ADMIN — Medication 3 ML: at 08:28

## 2018-10-07 RX ADMIN — HYDROCODONE BITARTRATE AND ACETAMINOPHEN 1 TABLET: 7.5; 325 TABLET ORAL at 07:38

## 2018-10-07 RX ADMIN — ONDANSETRON 4 MG: 2 INJECTION INTRAMUSCULAR; INTRAVENOUS at 07:38

## 2018-10-07 RX ADMIN — ATENOLOL 50 MG: 50 TABLET ORAL at 08:28

## 2018-10-07 RX ADMIN — SUCRALFATE 1 G: 1 SUSPENSION ORAL at 06:36

## 2018-10-07 RX ADMIN — ATORVASTATIN CALCIUM 20 MG: 10 TABLET, FILM COATED ORAL at 08:28

## 2018-10-07 RX ADMIN — PANTOPRAZOLE SODIUM 40 MG: 40 INJECTION, POWDER, FOR SOLUTION INTRAVENOUS at 06:37

## 2018-10-07 RX ADMIN — HYDROCODONE BITARTRATE AND ACETAMINOPHEN 1 TABLET: 7.5; 325 TABLET ORAL at 02:54

## 2018-10-07 RX ADMIN — SUCRALFATE 1 G: 1 SUSPENSION ORAL at 00:22

## 2018-10-07 NOTE — PROGRESS NOTES
Discharge Planning Assessment  [facility]     Patient Name: Kika Morocho  MRN: [id number]  Today's Date: 10/7/2018    Admit Date: 10/4/2018          Discharge Needs Assessment     Row Name 10/07/18 1040       Living Environment    Lives With alone    Current Living Arrangements home/apartment/condo    Primary Care Provided by self    Provides Primary Care For no one    Able to Return to Prior Arrangements yes       Resource/Environmental Concerns    Resource/Environmental Concerns reliable transportation       Transition Planning    Patient/Family Anticipates Transition to home    Patient/Family Anticipated Services at Transition none    Transportation Anticipated other (see comments)   SW provided pt a cab voucher.  She could not locate anyone to pick her up and did not have the money.       Discharge Needs Assessment    Readmission Within the Last 30 Days no previous admission in last 30 days    Concerns to be Addressed no discharge needs identified    Equipment Currently Used at Home none    Anticipated Changes Related to Illness none    Equipment Needed After Discharge none    Discharge Coordination/Progress Pt has PCP and RX coverage.            Discharge Plan    No documentation.       Destination     No service coordination in this encounter.      Durable Medical Equipment     No service coordination in this encounter.      Dialysis/Infusion     No service coordination in this encounter.      Home Medical Care     No service coordination in this encounter.      Social Care     No service coordination in this encounter.        Expected Discharge Date and Time     Expected Discharge Date Expected Discharge Time    Oct 7, 2018               Demographic Summary    No documentation.           Functional Status    No documentation.           Psychosocial    No documentation.           Abuse/Neglect    No documentation.           Legal    No documentation.           Substance Abuse    No documentation.            Patient Forms    No documentation.         CHERYL MillerW

## 2018-10-07 NOTE — DISCHARGE SUMMARY
[facility] Medicine Services  DISCHARGE SUMMARY       Date of Admission: 10/4/2018  Date of Discharge:  10/7/2018  Primary Care Physician: Zelda Hahn APRN    Presenting Problem/History of Present Illness:  Epigastric/chest pain    Final Discharge Diagnoses:  1. Atypical chest pain, secondary to severe esophagitis  2. Nausea with vomiting  3. Odynophagia   4. Diabetes mellitus type II-uncontrolled with hyperglycemia-A1C 12.2%  5. Hypothyroidism  6. Hypertension  7. Gastroesophageal reflux disease  8. History of gastric bypass in the remote past    Consults:   1. Dr. Ishmael Enriquez-gastroenterology    Procedures Performed:   1. EGD 10/5 Evidence of a gastric bypass was found. A gastric pouch with a small size was found. The duodenumto-  jejunum limb was examined.  Findings:  The Z-line was regular and was found 34 cm from the incisors.  Severe esophagitis with no bleeding was found in the lower third of the esophagus. Biopsies were  taken with a cold forceps for histology.  - Gastric bypass with a small-sized pouch.  - Z-line regular, 34 cm from the incisors.  - Severe reflux esophagitis. Biopsied.    Pertinent Test Results:   Imaging Results (last 7 days)     ** No results found for the last 168 hours. **        Lab Results (last 7 days)     Procedure Component Value Units Date/Time    POC Glucose Once [[id number]]  (Normal) Collected:  10/07/18 0743    Specimen:  Blood Updated:  10/07/18 0806     Glucose 117 mg/dL      Comment: : [id number] Aretha Hernandez ID: [id number]       POC Glucose Once [[id number]]  (Normal) Collected:  10/06/18 2201    Specimen:  Blood Updated:  10/06/18 2218     Glucose 108 mg/dL      Comment: : [id number] Bubba Schroeder ID: [id number]       POC Glucose Once [[id number]]  (Normal) Collected:  10/06/18 1716    Specimen:  Blood Updated:  10/06/18 1733     Glucose 88 mg/dL      Comment: : [id number] Aretha Hernandez  ID: [id number]       POC Glucose Once [[id number]]  (Normal) Collected:  10/06/18 1210    Specimen:  Blood Updated:  10/06/18 1232     Glucose 119 mg/dL      Comment: : [id number] Aretha JainMeter ID: [id number]       POC Glucose Once [[id number]]  (Normal) Collected:  10/06/18 0754    Specimen:  Blood Updated:  10/06/18 0817     Glucose 105 mg/dL      Comment: : [id number] Abimaelatt BogdaninaMeter ID: [id number]       Basic Metabolic Panel [[id number]]  (Abnormal) Collected:  10/06/18 0449    Specimen:  Blood Updated:  10/06/18 0538     Glucose 102 (H) mg/dL      BUN 10 mg/dL      Creatinine 0.93 mg/dL      Sodium 139 mmol/L      Potassium 4.5 mmol/L      Chloride 106 mmol/L      CO2 23.0 (L) mmol/L      Calcium 8.6 mg/dL      eGFR Non African Amer 60 (L) mL/min/1.73      BUN/Creatinine Ratio 10.8     Anion Gap 10.0 mmol/L     Narrative:       GFR Normal >60  Chronic Kidney Disease <60  Kidney Failure <15    CBC & Differential [[id number]] Collected:  10/06/18 0449    Specimen:  Blood Updated:  10/06/18 0528    Narrative:       The following orders were created for panel order CBC & Differential.  Procedure                               Abnormality         Status                     ---------                               -----------         ------                     CBC Auto Differential[[id number]]        Abnormal            Final result                 Please view results for these tests on the individual orders.    CBC Auto Differential [[id number]]  (Abnormal) Collected:  10/06/18 0449    Specimen:  Blood Updated:  10/06/18 0528     WBC 5.22 10*3/mm3      RBC 3.70 (L) 10*6/mm3      Hemoglobin 11.3 (L) g/dL      Hematocrit 34.5 (L) %      MCV 93.2 fL      MCH 30.5 pg      MCHC 32.8 (L) g/dL      RDW 14.5 %      RDW-SD 50.0 fl      MPV 11.4 fL      Platelets 199 10*3/mm3      Neutrophil % 57.8 %      Lymphocyte % 28.0 %      Monocyte % 11.7 %      Eosinophil % 1.5 %      Basophil % 0.8 %      Immature  Grans % 0.2 %      Neutrophils, Absolute 3.02 10*3/mm3      Lymphocytes, Absolute 1.46 10*3/mm3      Monocytes, Absolute 0.61 10*3/mm3      Eosinophils, Absolute 0.08 10*3/mm3      Basophils, Absolute 0.04 10*3/mm3      Immature Grans, Absolute 0.01 10*3/mm3      nRBC 0.0 /100 WBC     POC Glucose Once [[id number]]  (Normal) Collected:  10/05/18 2023    Specimen:  Blood Updated:  10/05/18 2043     Glucose 115 mg/dL      Comment: : [id number] Clifford PeteamyjeniferMin ID: [id number]       POC Glucose Once [[id number]]  (Normal) Collected:  10/05/18 1707    Specimen:  Blood Updated:  10/05/18 1718     Glucose 104 mg/dL      Comment: : [id number] Dey KaylaMeter ID: [id number]       POC Glucose Once [[id number]]  (Normal) Collected:  10/05/18 1122    Specimen:  Blood Updated:  10/05/18 1133     Glucose 115 mg/dL      Comment: : [id number] [employer] Geo ServicesylaMeter ID: [id number]       Tissue Pathology Exam [[id number]] Collected:  10/05/18 0953    Specimen:  Tissue from Esophagus Updated:  10/05/18 1037    POC Glucose Once [[id number]]  (Normal) Collected:  10/05/18 0800    Specimen:  Blood Updated:  10/05/18 0811     Glucose 110 mg/dL      Comment: : [id number] [employer] ServicesylaMeter ID: [id number]       Hemoglobin A1c [[id number]] Collected:  10/05/18 0503    Specimen:  Blood Updated:  10/05/18 0808     Hemoglobin A1C 12.2 %     Narrative:       Less than 6.0           Non-Diabetic Range  6.0-7.0                 ADA Therapeutic Target  Greater than 7.0        Action Suggested    TSH [[id number]]  (Normal) Collected:  10/05/18 0503    Specimen:  Blood Updated:  10/05/18 0655     TSH 1.250 mIU/mL     Troponin [[id number]]  (Normal) Collected:  10/05/18 0503    Specimen:  Blood Updated:  10/05/18 0635     Troponin I <0.012 ng/mL     Lipid Panel [[id number]]  (Abnormal) Collected:  10/05/18 0503    Specimen:  Blood Updated:  10/05/18 0634     Total Cholesterol 90 (L) mg/dL      Triglycerides 77 mg/dL      HDL Cholesterol 30 (L)  mg/dL      LDL Cholesterol  38 mg/dL      LDL/HDL Ratio 1.49    CBC (No Diff) [[id number]]  (Abnormal) Collected:  10/05/18 0503    Specimen:  Blood Updated:  10/05/18 0634     WBC 5.60 10*3/mm3      RBC 3.72 (L) 10*6/mm3      Hemoglobin 11.2 (L) g/dL      Hematocrit 33.2 (L) %      MCV 89.2 fL      MCH 30.1 pg      MCHC 33.7 g/dL      RDW 14.6 %      RDW-SD 47.3 fl      MPV 11.9 fL      Platelets 204 10*3/mm3     Basic Metabolic Panel [[id number]]  (Abnormal) Collected:  10/05/18 0503    Specimen:  Blood Updated:  10/05/18 0624     Glucose 119 (H) mg/dL      BUN 14 mg/dL      Creatinine 0.95 mg/dL      Sodium 139 mmol/L      Potassium 4.2 mmol/L      Chloride 107 mmol/L      CO2 24.0 mmol/L      Calcium 8.4 mg/dL      eGFR Non African Amer 58 (L) mL/min/1.73      BUN/Creatinine Ratio 14.7     Anion Gap 8.0 mmol/L     Narrative:       GFR Normal >60  Chronic Kidney Disease <60  Kidney Failure <15    POC Glucose Once [[id number]]  (Abnormal) Collected:  10/05/18 0006    Specimen:  Blood Updated:  10/05/18 0024     Glucose 137 (H) mg/dL      Comment: : [id number] Clifford SandovalMeter ID: [id number]       Troponin [[id number]]  (Normal) Collected:  10/04/18 2246    Specimen:  Blood Updated:  10/04/18 2319     Troponin I <0.012 ng/mL         Hospital Course:  The patient is a 68 y.o. female who follows with Zelda Cr nurse practitioner for her primary care.  She has a past medical history significant for hypertension, hyperlipidemia, uncontrolled diabetes mellitus, osteoporosis, hypothyroidism, and gastric bypass.  Patient presented to the emergency department at [facility] on 10/5 with complaints of chest pain/epigastric pain.  In addition, she had nausea and vomiting.  She received a GI cocktail and Zofran without significant improvement.  Nitroglycerin did not improve it either.  She was transferred here for further evaluation and management.  Some initial concerns of esophageal spasms.  Addition was  having some odynophagia/dysphagia.    She was seen in consultation by Dr. Ishmael Enriquez with gastroenterology and underwent endoscopy on 10/5/18.  She had severe esophagitis she was placed on IV Protonix as well as oral Carafate.  She was given IV fluid resuscitation.  Troponins were trended and were negative.  This was cardiac in nature given the findings on endoscopy.  She was started on clear liquids and has been advanced to full liquids and she is tolerating this with her medications.  Her pain is improving but not resolved.  Her blood glucoses were monitored and were actually were within normal limits.  Hemoglobin C was 12.2 and she was visited by our diabetic educator.  Apparently patient had been taking Trulicity samples and a failed to mention this upon her admission.  She states that she has taken this samples over the last few weeks.  She does tell me that her A1c should be coming down as her sugars were above 500.  She tells me that her most recent ones have been in the 100s to 190s max.  Given the pharmacology of Trulicity is not recommended that she continue this medication post discharge.  This could have contributed to her esophagitis given the fact that it slows the gastric motility.  She will need to stay on twice daily proton pump inhibitor and Carafate for an extended period of time which will be determined by gastroenterology.  She will see them in 1 month for follow-up.  We have discussed diet options and she states that she feels like she can do better at home she is stable for discharge today.  I will send her home with some few doses of pain medication as well as anti-emetics.    She will continue metformin post discharge and follow up closely with her PCP.  Dr. Enriquez did recommend that she be treated for 7 days with nystatin therapy as well.  We have prescribed this for her post discharge.      Physical Exam on Discharge:  /64 (BP Location: Left arm, Patient Position: Lying)   Pulse  "66   Temp 98.6 °F (37 °C) (Oral)   Resp 16   Ht 162.6 cm (64\")   Wt 86.5 kg (190 lb 9.6 oz)   SpO2 97%   BMI 32.72 kg/m²   Physical Exam   Constitutional: She is oriented to person, place, and time. She appears well-developed and well-nourished.   HENT:   Head: Normocephalic and atraumatic.   Eyes: Pupils are equal, round, and reactive to light. Conjunctivae and EOM are normal.   Neck: Neck supple. No JVD present. No thyromegaly present.   Cardiovascular: Normal rate, regular rhythm, normal heart sounds and intact distal pulses.  Exam reveals no gallop and no friction rub.    No murmur heard.  Pulmonary/Chest: Effort normal and breath sounds normal. No respiratory distress. She has no wheezes. She has no rales. She exhibits no tenderness.   Abdominal: Soft. Bowel sounds are normal. She exhibits no distension. There is no tenderness. There is no rebound and no guarding.   Musculoskeletal: Normal range of motion. She exhibits no edema, tenderness or deformity.   Lymphadenopathy:     She has no cervical adenopathy.   Neurological: She is alert and oriented to person, place, and time. She displays normal reflexes. No cranial nerve deficit. She exhibits normal muscle tone.   Skin: Skin is warm and dry. No rash noted.   Psychiatric: She has a normal mood and affect. Her behavior is normal. Judgment and thought content normal.     Condition on Discharge: Stable    Discharge Disposition:  Home or Self Care    Discharge Medications:     Discharge Medications      New Medications      Instructions Start Date   HYDROcodone-acetaminophen 7.5-325 MG per tablet  Commonly known as:  NORCO   1 tablet, Oral, Every 6 Hours PRN      nystatin 611223 UNIT/ML suspension  Commonly known as:  MYCOSTATIN   5 mL, Swish & Swallow, 4 Times Daily      omeprazole 40 MG capsule  Commonly known as:  priLOSEC   40 mg, Oral, 2 Times Daily      ondansetron 4 MG tablet  Commonly known as:  ZOFRAN   4 mg, Oral, Every 8 Hours PRN      sucralfate 1 " g tablet  Commonly known as:  CARAFATE   1 g, Oral, 4 Times Daily         Continue These Medications      Instructions Start Date   amLODIPine 5 MG tablet  Commonly known as:  NORVASC   5 mg, Daily      atenolol 50 MG tablet  Commonly known as:  TENORMIN   50 mg, Oral, 2 Times Daily      atorvastatin 20 MG tablet  Commonly known as:  LIPITOR   20 mg, Oral, Daily      docusate sodium 100 MG capsule  Commonly known as:  COLACE   100 mg, Oral, 2 Times Daily PRN      FOSAMAX 70 MG tablet  Generic drug:  alendronate   70 mg, Oral, Every 7 Days      furosemide 40 MG tablet  Commonly known as:  LASIX   40 mg, Oral, Daily PRN      levothyroxine 100 MCG tablet  Commonly known as:  SYNTHROID, LEVOTHROID   No dose, route, or frequency recorded.      losartan 100 MG tablet  Commonly known as:  COZAAR   100 mg, Oral, Daily      metFORMIN 500 MG tablet  Commonly known as:  GLUCOPHAGE   500 mg, Oral, 2 Times Daily      VITAMIN B-12 IJ   Injection           Discharge Diet:   Diet Instructions     Diet: Full Liquid, Consistent Carbohydrate; Thin Liquids, No Restrictions       Discharge Diet:   Full Liquid  Consistent Carbohydrate       Fluid Consistency:  Thin Liquids, No Restrictions        Activity at Discharge:   Activity Instructions     Activity as Tolerated             Follow-up Appointments:   Future Appointments  Date Time Provider Department Center   10/31/2018 1:00 PM Jessica Johnson AUD [facility] None   10/31/2018 1:30 PM Temo Booth PA [facility] None   1. 1 week with Zelda Hahn  2. 1 month with Dr. Gianni Yang.     Test Results Pending at Discharge: none    MARSHALL Sanford  10/07/18  9:25 AM    Time: 25 minutes.

## 2018-10-07 NOTE — PLAN OF CARE
Problem: Patient Care Overview  Goal: Plan of Care Review  Outcome: Ongoing (interventions implemented as appropriate)   10/07/18 0127   Coping/Psychosocial   Plan of Care Reviewed With patient   Plan of Care Review   Progress no change

## 2018-10-07 NOTE — PROGRESS NOTES
CC:  Esophagitis    Subjective:   Still also complaints of midepigastric pain.  Able to tolerate liquids.  Able to tolerate her medication.      Current Facility-Administered Medications:   •  amLODIPine (NORVASC) tablet 5 mg, 5 mg, Oral, Q24H, Ruth Worley, APRN, 5 mg at 10/06/18 0859  •  atenolol (TENORMIN) tablet 50 mg, 50 mg, Oral, BID, Ruth Worley, APRN, 50 mg at 10/06/18 2153  •  atorvastatin (LIPITOR) tablet 20 mg, 20 mg, Oral, Daily, Ruth Worley, APRN, 20 mg at 10/06/18 0859  •  dextrose (D50W) 25 g/ 50mL Intravenous Solution 25 g, 25 g, Intravenous, Q15 Min PRN, Ruth Worley, APRN  •  dextrose (GLUTOSE) oral gel 15 g, 15 g, Oral, Q15 Min PRN, Ruth Worley, APRN  •  glucagon (human recombinant) (GLUCAGEN DIAGNOSTIC) injection 1 mg, 1 mg, Subcutaneous, PRN, Ruth Worley, APRN  •  HYDROcodone-acetaminophen (NORCO) 7.5-325 MG per tablet 1 tablet, 1 tablet, Oral, Q4H PRN, Kate Brown, APRN, 1 tablet at 10/07/18 0738  •  insulin lispro (humaLOG) injection 2-7 Units, 2-7 Units, Subcutaneous, 4x Daily With Meals & Nightly, Ruth Worley, APRN  •  levothyroxine (SYNTHROID, LEVOTHROID) tablet 100 mcg, 100 mcg, Oral, Q AM, Ruth Worley, APRN, 100 mcg at 10/07/18 0636  •  losartan (COZAAR) tablet 100 mg, 100 mg, Oral, Daily, Ruth Worley, APRN, 100 mg at 10/06/18 0900  •  [DISCONTINUED] HYDROmorphone (DILAUDID) injection 0.5 mg, 0.5 mg, Intravenous, Q2H PRN, 0.5 mg at 10/06/18 1022 **AND** naloxone (NARCAN) injection 0.4 mg, 0.4 mg, Intravenous, Q5 Min PRN, Ruth Worley, APRN  •  nystatin (MYCOSTATIN) 887625 UNIT/ML suspension 500,000 Units, 5 mL, Swish & Swallow, 4x Daily, Ishmael Enriquez MD, 500,000 Units at 10/07/18 0744  •  ondansetron (ZOFRAN) injection 4 mg, 4 mg, Intravenous, Q6H PRN, Ruth Worley, MARSHALL, 4 mg at 10/07/18 0738  •  pantoprazole (PROTONIX) injection 40 mg, 40 mg, Intravenous, Q AM, Ruth Worley, APRMONAE, 40 mg at 10/07/18  0637  •  promethazine (PHENERGAN) tablet 12.5 mg, 12.5 mg, Oral, Q6H PRN, 12.5 mg at 10/07/18 0304 **OR** promethazine (PHENERGAN) injection 12.5 mg, 12.5 mg, Intramuscular, Q6H PRN **OR** promethazine (PHENERGAN) injection 12.5 mg, 12.5 mg, Intravenous, Q6H PRN **OR** promethazine (PHENERGAN) suppository 12.5 mg, 12.5 mg, Rectal, Q6H PRN, Ruth Worley APRN  •  sodium chloride 0.9 % flush 3 mL, 3 mL, Intravenous, Q12H, Ruth Worley APRN, 3 mL at 10/06/18 2153  •  sodium chloride 0.9 % flush 3-10 mL, 3-10 mL, Intravenous, PRN, Ruth Worley APRMONAE  •  sucralfate (CARAFATE) 1 GM/10ML suspension 1 g, 1 g, Oral, Q6H, Matthias Worley MD, 1 g at 10/07/18 0636    Review of Systems   Respiratory: Negative.    Cardiovascular: Negative.    Gastrointestinal: Positive for abdominal pain. Negative for abdominal distention.         Vital Signs:  Temp:  [98.3 °F (36.8 °C)-98.6 °F (37 °C)] 98.6 °F (37 °C)  Heart Rate:  [62-68] 66  Resp:  [16] 16  BP: (111-137)/(45-64) 137/64  Body mass index is 32.72 kg/m².     Physical Exam   Cardiovascular: Normal rate.    Pulmonary/Chest: Effort normal.   Abdominal: Soft. Bowel sounds are normal.   Neurological: She is alert.        Results Review:     LABS:     Results from last 7 days  Lab Units 10/06/18  0449 10/05/18  0503   WBC 10*3/mm3 5.22 5.60   HEMOGLOBIN g/dL 11.3* 11.2*   HEMATOCRIT % 34.5* 33.2*   PLATELETS 10*3/mm3 199 204         Results from last 7 days  Lab Units 10/06/18  0449 10/05/18  0503   SODIUM mmol/L 139 139   POTASSIUM mmol/L 4.5 4.2   CHLORIDE mmol/L 106 107   CO2 mmol/L 23.0* 24.0   BUN mg/dL 10 14   CREATININE mg/dL 0.93 0.95   CALCIUM mg/dL 8.6 8.4   GLUCOSE mg/dL 102* 119*             No results found for: LIPASE    Radiology:    Imaging Results (last 24 hours)     ** No results found for the last 24 hours. **          Impression/Plan:    Patient Active Problem List   Diagnosis   • Tinnitus   • Hearing loss, sensorineural   • Atypical chest pain        1.  Esophagitis.  Tolerating full liquids.  She is able take her medications.  She will need to be discharged on twice a day proton pump inhibitors for an extended period of time.  I would also leave her on some nystatin swish and swallow for 7 days.  Arrange follow-up with Dr. Yang in 1 month for repeat endoscopy.  Okay to discharge home from my standpoint.    EMR Dragon/transcription disclaimer: Much of this encounter note is electronic transcription/translation of spoken language to printed text.  The electronic translation of spoken language may permit erroneous, or at times, nonsensical words or phrases to be inadvertently transcribed.  Although I have reviewed the note for such errors, some may still exist.      Ishmael Enriquez MD  10/07/18  8:22 AM

## 2018-10-09 ENCOUNTER — TELEPHONE (OUTPATIENT)
Dept: GASTROENTEROLOGY | Facility: CLINIC | Age: 68
End: 2018-10-09

## 2018-10-09 NOTE — TELEPHONE ENCOUNTER
Dr. sawyer sent message that patient needed follow up with dr. Yang and scheduled for endo for severe esophagitis. She has office visit 11-5-18 and will schedule endo at that time.

## 2018-10-11 LAB
CYTO UR: NORMAL
LAB AP CASE REPORT: NORMAL
PATH REPORT.FINAL DX SPEC: NORMAL
PATH REPORT.GROSS SPEC: NORMAL

## 2018-11-14 ENCOUNTER — OFFICE VISIT (OUTPATIENT)
Dept: GASTROENTEROLOGY | Facility: CLINIC | Age: 68
End: 2018-11-14

## 2018-11-14 VITALS
TEMPERATURE: 97 F | HEART RATE: 67 BPM | HEIGHT: 64 IN | OXYGEN SATURATION: 98 % | SYSTOLIC BLOOD PRESSURE: 130 MMHG | WEIGHT: 171 LBS | BODY MASS INDEX: 29.19 KG/M2 | DIASTOLIC BLOOD PRESSURE: 80 MMHG

## 2018-11-14 DIAGNOSIS — K21.00 GASTROESOPHAGEAL REFLUX DISEASE WITH ESOPHAGITIS: Primary | ICD-10-CM

## 2018-11-14 PROCEDURE — 99213 OFFICE O/P EST LOW 20 MIN: CPT | Performed by: INTERNAL MEDICINE

## 2018-11-14 RX ORDER — OMEPRAZOLE 40 MG/1
40 CAPSULE, DELAYED RELEASE ORAL DAILY
Qty: 30 CAPSULE | Refills: 11 | Status: SHIPPED | OUTPATIENT
Start: 2018-11-14 | End: 2020-01-09 | Stop reason: SDUPTHER

## 2018-11-14 NOTE — PROGRESS NOTES
Chief Complaint   Patient presents with   • Endoscopy     was in hospital had endo needs scheduled another endo     Subjective   HPI     Kika Morocho is a 68 y.o. female who underwent endoscopy on 10/5/18 for further evaluation of chest pain  Pt tolerated procedure well without any complications.   Endoscopically She  was found to have severe esophagitis/ulcers  Biopsies were taken during procedure.  Histologically biopsies taken during procedure were found to be negative for evidence of malignancy  She currently denies difficulty with abdominal pain, changes in bowels.  Sx have improved.  She stopped use of bid Prilosec due to improvement in symptoms.  At this time she denies heartburn, no dysphagia.  Colonoscopy 2015 reviewed with pt.  No changes in bowels.     Past Medical History:   Diagnosis Date   • Anemia    • Disease of thyroid gland    • GERD (gastroesophageal reflux disease)    • Hearing loss, sensorineural 10/28/2016   • HLD (hyperlipidemia)    • HTN (hypertension)    • Osteopenia    • Tinnitus      Outpatient Medications Marked as Taking for the 11/14/18 encounter (Office Visit) with Gianni Yang, DO   Medication Sig Dispense Refill   • amLODIPine (NORVASC) 5 MG tablet 5 mg Daily.     • atenolol (TENORMIN) 50 MG tablet Take 50 mg by mouth 2 (Two) Times a Day.     • atorvastatin (LIPITOR) 20 MG tablet Take 20 mg by mouth Daily.     • Cyanocobalamin (VITAMIN B-12 IJ) Inject  as directed.     • docusate sodium (COLACE) 100 MG capsule Take 100 mg by mouth 2 (Two) Times a Day As Needed for constipation.     • furosemide (LASIX) 40 MG tablet Take 40 mg by mouth Daily As Needed.     • levothyroxine (SYNTHROID, LEVOTHROID) 100 MCG tablet      • losartan (COZAAR) 100 MG tablet Take 100 mg by mouth Daily.     • metFORMIN (GLUCOPHAGE) 500 MG tablet Take 500 mg by mouth 2 (Two) Times a Day.     • omeprazole (priLOSEC) 40 MG capsule Take 1 capsule by mouth Daily. 30 capsule 11   • [DISCONTINUED] omeprazole  (priLOSEC) 40 MG capsule Take 1 capsule by mouth 2 (Two) Times a Day. 60 capsule 1     No Known Allergies  Social History     Socioeconomic History   • Marital status:      Spouse name: Not on file   • Number of children: Not on file   • Years of education: Not on file   • Highest education level: Not on file   Social Needs   • Financial resource strain: Not on file   • Food insecurity - worry: Not on file   • Food insecurity - inability: Not on file   • Transportation needs - medical: Not on file   • Transportation needs - non-medical: Not on file   Occupational History   • Not on file   Tobacco Use   • Smoking status: Current Every Day Smoker     Packs/day: 0.50     Years: 45.00     Pack years: 22.50   • Smokeless tobacco: Never Used   Substance and Sexual Activity   • Alcohol use: No   • Drug use: No   • Sexual activity: Not on file   Other Topics Concern   • Not on file   Social History Narrative   • Not on file     Family History   Problem Relation Age of Onset   • Diabetes Mother    • Stroke Mother    • Cancer Mother    • Heart failure Father    • Cancer Sister    • Colon cancer Neg Hx    • Colon polyps Neg Hx    • Esophageal cancer Neg Hx      Review of Systems   Constitutional: Negative for fatigue, fever and unexpected weight change.   HENT: Negative for hearing loss, sore throat and voice change.    Eyes: Negative for visual disturbance.   Respiratory: Negative for cough, shortness of breath and wheezing.    Cardiovascular: Negative for chest pain and palpitations.   Gastrointestinal: Negative for abdominal pain, blood in stool and vomiting.   Endocrine: Negative for polydipsia and polyuria.   Genitourinary: Negative for difficulty urinating, dysuria, hematuria and urgency.   Musculoskeletal: Negative for joint swelling and myalgias.   Skin: Negative for color change, rash and wound.   Neurological: Negative for dizziness, tremors, seizures and syncope.   Hematological: Does not bruise/bleed  easily.   Psychiatric/Behavioral: Negative for agitation and confusion. The patient is not nervous/anxious.      Objective   Vitals:    11/14/18 1418   BP: 130/80   Pulse: 67   Temp: 97 °F (36.1 °C)   SpO2: 98%     Physical Exam   Constitutional: She is oriented to person, place, and time. She appears well-developed and well-nourished. She is cooperative.   HENT:   Head: Normocephalic and atraumatic.   Eyes: Conjunctivae are normal. Pupils are equal, round, and reactive to light. No scleral icterus.   Neck: Normal range of motion. Neck supple. No JVD present. No thyroid mass and no thyromegaly present.   Cardiovascular: Normal rate, regular rhythm and normal heart sounds. Exam reveals no gallop and no friction rub.   No murmur heard.  Pulmonary/Chest: Effort normal and breath sounds normal. No accessory muscle usage. No respiratory distress. She has no wheezes. She has no rales.   Abdominal: Soft. Normal appearance and bowel sounds are normal. She exhibits no distension, no ascites and no mass. There is no hepatosplenomegaly. There is no tenderness. There is no rebound and no guarding.   Musculoskeletal: Normal range of motion. She exhibits no edema or tenderness.     Vascular Status -  Her right foot exhibits normal foot vasculature  and no edema. Her left foot exhibits normal foot vasculature  and no edema.  Lymphadenopathy:     She has no cervical adenopathy.   Neurological: She is alert and oriented to person, place, and time. She has normal strength. Gait normal.   Skin: Skin is warm, dry and intact. No rash noted.     Imaging Results (most recent)     None        Body mass index is 29.35 kg/m².  Assessment/Plan   Kika was seen today for endoscopy.    Diagnoses and all orders for this visit:    Gastroesophageal reflux disease with esophagitis  -     Case Request; Standing  -     Implement Anesthesia Orders Day of Procedure; Standing  -     Obtain Informed Consent; Standing  -     Case Request  -     omeprazole  (priLOSEC) 40 MG capsule; Take 1 capsule by mouth Daily.      ESOPHAGOGASTRODUODENOSCOPY WITH ANESTHESIA (N/A)    Restart Prilosec, take 30 min prior to breakfast  I do not recommend stopping medication  Explained risk for Saba's and Saba's being a pre cancer condition  Ok to stop Carafate  EGD pics reviewed with pt    EGD recall in 2  months   To verify pt does not have Saba's Esophagus  Recommend EGD after being on medication for 2 months    I advised Kika of the risks of continuing to use tobacco, and I provided her with tobacco cessation educational materials in the After Visit Summary.     During this visit, I spent 5 minutes counseling the patient regarding tobacco cessation.    Patient's Body mass index is 29.35 kg/m². BMI is above normal parameters. Recommendations include: no follow-up required.

## 2018-11-15 PROBLEM — K21.00 GASTROESOPHAGEAL REFLUX DISEASE WITH ESOPHAGITIS: Status: ACTIVE | Noted: 2018-11-15

## 2019-01-11 ENCOUNTER — HOSPITAL ENCOUNTER (OUTPATIENT)
Facility: HOSPITAL | Age: 69
Setting detail: HOSPITAL OUTPATIENT SURGERY
Discharge: HOME | End: 2019-01-11
Attending: INTERNAL MEDICINE | Admitting: INTERNAL MEDICINE

## 2019-01-11 ENCOUNTER — ANESTHESIA EVENT (OUTPATIENT)
Dept: GASTROENTEROLOGY | Facility: HOSPITAL | Age: 69
End: 2019-01-11

## 2019-01-11 ENCOUNTER — ANESTHESIA (OUTPATIENT)
Dept: GASTROENTEROLOGY | Facility: HOSPITAL | Age: 69
End: 2019-01-11

## 2019-01-11 VITALS
HEIGHT: 65 IN | SYSTOLIC BLOOD PRESSURE: 124 MMHG | WEIGHT: 168 LBS | TEMPERATURE: 98 F | DIASTOLIC BLOOD PRESSURE: 68 MMHG | RESPIRATION RATE: 17 BRPM | HEART RATE: 70 BPM | OXYGEN SATURATION: 98 % | BODY MASS INDEX: 27.99 KG/M2

## 2019-01-11 DIAGNOSIS — K21.00 GASTROESOPHAGEAL REFLUX DISEASE WITH ESOPHAGITIS: ICD-10-CM

## 2019-01-11 LAB — GLUCOSE BLDC GLUCOMTR-MCNC: 202 MG/DL (ref 70–130)

## 2019-01-11 PROCEDURE — 25010000002 PROPOFOL 10 MG/ML EMULSION: Performed by: NURSE ANESTHETIST, CERTIFIED REGISTERED

## 2019-01-11 PROCEDURE — 82962 GLUCOSE BLOOD TEST: CPT

## 2019-01-11 PROCEDURE — 43235 EGD DIAGNOSTIC BRUSH WASH: CPT | Performed by: INTERNAL MEDICINE

## 2019-01-11 RX ORDER — PROPOFOL 10 MG/ML
VIAL (ML) INTRAVENOUS AS NEEDED
Status: DISCONTINUED | OUTPATIENT
Start: 2019-01-11 | End: 2019-01-11 | Stop reason: SURG

## 2019-01-11 RX ORDER — SODIUM CHLORIDE 0.9 % (FLUSH) 0.9 %
3 SYRINGE (ML) INJECTION AS NEEDED
Status: DISCONTINUED | OUTPATIENT
Start: 2019-01-11 | End: 2019-01-11 | Stop reason: HOSPADM

## 2019-01-11 RX ORDER — SODIUM CHLORIDE 9 MG/ML
500 INJECTION, SOLUTION INTRAVENOUS CONTINUOUS PRN
Status: DISCONTINUED | OUTPATIENT
Start: 2019-01-11 | End: 2019-01-11 | Stop reason: HOSPADM

## 2019-01-11 RX ORDER — LIDOCAINE HYDROCHLORIDE 20 MG/ML
INJECTION, SOLUTION INFILTRATION; PERINEURAL AS NEEDED
Status: DISCONTINUED | OUTPATIENT
Start: 2019-01-11 | End: 2019-01-11 | Stop reason: SURG

## 2019-01-11 RX ADMIN — LIDOCAINE HYDROCHLORIDE 100 MG: 20 INJECTION, SOLUTION INFILTRATION; PERINEURAL at 10:45

## 2019-01-11 RX ADMIN — LIDOCAINE HYDROCHLORIDE 0.5 ML: 10 INJECTION, SOLUTION EPIDURAL; INFILTRATION; INTRACAUDAL; PERINEURAL at 10:19

## 2019-01-11 RX ADMIN — PROPOFOL 50 MG: 10 INJECTION, EMULSION INTRAVENOUS at 10:49

## 2019-01-11 RX ADMIN — PROPOFOL 100 MG: 10 INJECTION, EMULSION INTRAVENOUS at 10:45

## 2019-01-11 RX ADMIN — SODIUM CHLORIDE 500 ML: 9 INJECTION, SOLUTION INTRAVENOUS at 10:19

## 2019-01-11 NOTE — H&P
[facility] Gastroenterology  Pre Procedure History & Physical    Chief Complaint:   Re check Ulcers    Subjective     HPI:   Ulcers prev    Past Medical History:   Past Medical History:   Diagnosis Date   • Anemia    • Disease of thyroid gland    • GERD (gastroesophageal reflux disease)    • Hearing loss, sensorineural 10/28/2016   • HLD (hyperlipidemia)    • HTN (hypertension)    • Osteopenia    • Tinnitus        Past Surgical History:  Past Surgical History:   Procedure Laterality Date   • BREAST BIOPSY     • COLONOSCOPY  11/24/2008    Hyperlastic polyp   • DENTAL PROCEDURE     • ENDOSCOPY      Per Dr. Cruz   • ENDOSCOPY N/A 10/5/2018    Procedure: ESOPHAGOGASTRODUODENOSCOPY WITH ANESTHESIA;  Surgeon: Ishmael Enriquez MD;  Location: [facility] ENDOSCOPY;  Service: Gastroenterology   • GASTRIC BYPASS     • HERNIA REPAIR     • HYSTERECTOMY         Family History:  Family History   Problem Relation Age of Onset   • Diabetes Mother    • Stroke Mother    • Cancer Mother    • Heart failure Father    • Cancer Sister    • Colon cancer Neg Hx    • Colon polyps Neg Hx    • Esophageal cancer Neg Hx        Social History:   reports that she has been smoking.  She has a 22.50 pack-year smoking history. she has never used smokeless tobacco. She reports that she does not drink alcohol or use drugs.    Medications:   Prior to Admission medications    Medication Sig Start Date End Date Taking? Authorizing Provider   alendronate (FOSAMAX) 70 MG tablet Take 70 mg by mouth Every 7 (Seven) Days.   Yes Bubba Beal MD   amLODIPine (NORVASC) 5 MG tablet 5 mg Daily. 10/5/17  Yes Bubba Beal MD   atenolol (TENORMIN) 50 MG tablet Take 50 mg by mouth 2 (Two) Times a Day.   Yes Bubba Beal MD   atorvastatin (LIPITOR) 20 MG tablet Take 20 mg by mouth Daily.   Yes Bubba Beal MD   Cyanocobalamin (VITAMIN B-12 IJ) Inject  as directed.   Yes Bubba Beal MD   docusate sodium (COLACE) 100 MG  "capsule Take 100 mg by mouth 2 (Two) Times a Day As Needed for constipation.   Yes Provider, MD Bubba   furosemide (LASIX) 40 MG tablet Take 40 mg by mouth Daily As Needed.   Yes Provider, MD Bubba   levothyroxine (SYNTHROID, LEVOTHROID) 100 MCG tablet  8/24/17  Yes Provider, MD Bubba   losartan (COZAAR) 100 MG tablet Take 100 mg by mouth Daily.   Yes Provider, MD Bubba   metFORMIN (GLUCOPHAGE) 500 MG tablet Take 500 mg by mouth 2 (Two) Times a Day.   Yes Provider, MD Bubba   omeprazole (priLOSEC) 40 MG capsule Take 1 capsule by mouth Daily. 11/14/18  Yes Gianni Yang DO   ondansetron (ZOFRAN) 4 MG tablet Take 1 tablet by mouth Every 8 (Eight) Hours As Needed for Nausea or Vomiting. 10/7/18   Kate Brown APRN   sucralfate (CARAFATE) 1 g tablet Take 1 tablet by mouth 4 (Four) Times a Day. 10/7/18   Kate Brown APRN       Allergies:  Patient has no known allergies.    ROS:    General: Weight stable  Resp: No SOA  Cardiovascular: No CP    Objective     Blood pressure 147/76, pulse 68, temperature 98 °F (36.7 °C), temperature source Temporal, resp. rate 20, height 165 cm (64.96\"), weight 76.2 kg (168 lb), SpO2 99 %.    Physical Exam   Constitutional: Pt is oriented to person, place, and in no distress.   HENT: Mouth/Throat: Oropharynx is clear.   Cardiovascular: Normal rate, regular rhythm.    Pulmonary/Chest: Effort normal. No respiratory distress. No  wheezes.   Abdominal: Soft. Non-distended.  Skin: Skin is warm and dry.   Psychiatric: Mood, memory, affect and judgment appear normal.     Assessment/Plan     Diagnosis:  Re check Ulcers    Anticipated Surgical Procedure:  EGD    The risks, benefits, and alternatives of this procedure have been discussed with the patient or the responsible party- the patient understands and agrees to proceed.        "

## 2019-01-11 NOTE — ANESTHESIA PREPROCEDURE EVALUATION
Anesthesia Evaluation     Patient summary reviewed   no history of anesthetic complications:  NPO Solid Status: > 8 hours  NPO Liquid Status: > 8 hours           Airway   Mallampati: I  TM distance: >3 FB  Neck ROM: full  Dental    (+) edentulous, upper dentures and lower dentures    Pulmonary - normal exam    breath sounds clear to auscultation  (+) a smoker (0.5 ppd) Current,   (-) COPD, asthma, recent URI, sleep apnea  Cardiovascular - normal exam  Exercise tolerance: good (4-7 METS)    Patient on routine beta blocker and Beta blocker given within 24 hours of surgery  Rhythm: regular  Rate: normal    (+) hypertension well controlled, hyperlipidemia,   (-) pacemaker, past MI, angina, cardiac stents, CABG      Neuro/Psych  (-) seizures, TIA, CVA  GI/Hepatic/Renal/Endo    (+)  GERD well controlled,  diabetes mellitus, hypothyroidism,   (-) liver disease, no renal disease, hyperthyroidism    ROS Comment: Gastric bypass in 1981    Musculoskeletal     Abdominal    Substance History   (-) alcohol use     OB/GYN          Other        ROS/Med Hx Other: History of esophageal stricture                    Anesthesia Plan    ASA 3     general   total IV anesthesia(Ok to take home atenolol prior to procedure)  intravenous induction   Anesthetic plan, all risks, benefits, and alternatives have been provided, discussed and informed consent has been obtained with: patient.

## 2019-12-14 DIAGNOSIS — K21.00 GASTROESOPHAGEAL REFLUX DISEASE WITH ESOPHAGITIS: ICD-10-CM

## 2019-12-17 RX ORDER — OMEPRAZOLE 40 MG/1
CAPSULE, DELAYED RELEASE ORAL
Qty: 30 CAPSULE | Refills: 0 | OUTPATIENT
Start: 2019-12-17

## 2019-12-18 ENCOUNTER — TELEPHONE (OUTPATIENT)
Dept: GASTROENTEROLOGY | Facility: CLINIC | Age: 69
End: 2019-12-18

## 2020-01-06 NOTE — PROGRESS NOTES
Chief Complaint   Patient presents with   • Heartburn     is having no problkems needs omeprazole 40 qd one month       PCP: Zelda Hahn APRN      Subjective   HPI    Pt presents to office with greater than 5 years history of GERD related symptoms.  GERD currently described as stable  Pt is maintained on Omeprazole daily .  Pt denies heartburn, indigestion, N/V, abdominal pain, dysphagia.  Pt is compliant with medication instruction.  Last EGD: 2019.  This procedure reviewed with patient.    Endoscopy (Dr Yang) 1/2019-normal   EGD 2018-severe esophagitis      CScope (Dr Yang) 2015     Past Medical History:   Diagnosis Date   • Anemia    • Disease of thyroid gland    • GERD (gastroesophageal reflux disease)    • Hearing loss, sensorineural 10/28/2016   • HLD (hyperlipidemia)    • HTN (hypertension)    • Osteopenia    • Tinnitus      Outpatient Medications Marked as Taking for the 1/9/20 encounter (Office Visit) with Fredy Blake APRN   Medication Sig Dispense Refill   • alendronate (FOSAMAX) 70 MG tablet Take 70 mg by mouth Every 7 (Seven) Days.     • amLODIPine (NORVASC) 5 MG tablet 5 mg Daily.     • atenolol (TENORMIN) 50 MG tablet Take 50 mg by mouth 2 (Two) Times a Day.     • atorvastatin (LIPITOR) 20 MG tablet Take 20 mg by mouth Daily.     • Cyanocobalamin (VITAMIN B-12 IJ) Inject  as directed.     • docusate sodium (COLACE) 100 MG capsule Take 100 mg by mouth 2 (Two) Times a Day As Needed for constipation.     • furosemide (LASIX) 40 MG tablet Take 40 mg by mouth Daily As Needed.     • levothyroxine (SYNTHROID, LEVOTHROID) 100 MCG tablet      • losartan (COZAAR) 100 MG tablet Take 100 mg by mouth Daily.     • metFORMIN (GLUCOPHAGE) 500 MG tablet Take 500 mg by mouth 2 (Two) Times a Day.     • [DISCONTINUED] omeprazole (priLOSEC) 40 MG capsule Take 1 capsule by mouth Daily. 30 capsule 11     No Known Allergies  Social History     Socioeconomic History   • Marital status:      Spouse  name: Not on file   • Number of children: Not on file   • Years of education: Not on file   • Highest education level: Not on file   Tobacco Use   • Smoking status: Current Every Day Smoker     Packs/day: 0.50     Years: 45.00     Pack years: 22.50   • Smokeless tobacco: Never Used   Substance and Sexual Activity   • Alcohol use: No   • Drug use: No     Family History   Problem Relation Age of Onset   • Diabetes Mother    • Stroke Mother    • Cancer Mother    • Heart failure Father    • Cancer Sister    • Colon cancer Neg Hx    • Colon polyps Neg Hx    • Esophageal cancer Neg Hx      Review of Systems   Constitutional: Negative for fatigue, fever and unexpected weight change.   HENT: Negative for hearing loss, sore throat and voice change.    Eyes: Negative for visual disturbance.   Respiratory: Negative for cough, shortness of breath and wheezing.    Cardiovascular: Negative for chest pain and palpitations.   Gastrointestinal: Negative for abdominal pain, blood in stool and vomiting.   Endocrine: Negative for polydipsia and polyuria.   Genitourinary: Negative for difficulty urinating, dysuria, hematuria and urgency.   Musculoskeletal: Negative for joint swelling and myalgias.   Skin: Negative for color change, rash and wound.   Neurological: Negative for dizziness, tremors, seizures and syncope.   Hematological: Does not bruise/bleed easily.   Psychiatric/Behavioral: Negative for agitation and confusion. The patient is not nervous/anxious.      Objective   Vitals:    01/09/20 1034   BP: 128/74   Pulse: 64   Temp: 97 °F (36.1 °C)   SpO2: 98%     Physical Exam   Constitutional: She is oriented to person, place, and time. She appears well-developed and well-nourished. She is cooperative.   HENT:   Head: Normocephalic and atraumatic.   Eyes: Pupils are equal, round, and reactive to light. Conjunctivae are normal. No scleral icterus.   Neck: Normal range of motion. Neck supple. No JVD present. No thyroid mass and no  thyromegaly present.   Cardiovascular: Normal rate, regular rhythm and normal heart sounds. Exam reveals no gallop and no friction rub.   No murmur heard.  Pulmonary/Chest: Effort normal and breath sounds normal. No accessory muscle usage. No respiratory distress. She has no wheezes. She has no rales.   Abdominal: Soft. Normal appearance and bowel sounds are normal. She exhibits no distension, no ascites and no mass. There is no hepatosplenomegaly. There is no tenderness. There is no rebound and no guarding.   Musculoskeletal: Normal range of motion. She exhibits no edema or tenderness.     Vascular Status -  Her right foot exhibits normal foot vasculature  and no edema. Her left foot exhibits normal foot vasculature  and no edema.  Lymphadenopathy:     She has no cervical adenopathy.   Neurological: She is alert and oriented to person, place, and time. She has normal strength. Gait normal.   Skin: Skin is warm, dry and intact. No rash noted.     Imaging Results (Most Recent)     None          Body mass index is 28.67 kg/m².    Assessment/Plan   Kika was seen today for heartburn.    Diagnoses and all orders for this visit:    Gastroesophageal reflux disease with esophagitis  -     omeprazole (priLOSEC) 40 MG capsule; Take 1 capsule by mouth Daily.      * Surgery not found *    Take PPI 30 min prior to breakfast   Decrease caffeine, nicotine, etoh- all contribute to acid reflux  Do not eat 2-3 hours within lying down, elevated HOB 4-6 inches     She will call office when time to schedule colonoscopy evaluation      Patient Instructions   Gastroesophageal Reflux Disease, Adult    Gastroesophageal reflux disease (GERD) happens when acid from your stomach flows up into the esophagus. When acid comes in contact with the esophagus, the acid causes soreness (inflammation) in the esophagus. Over time, GERD may create small holes (ulcers) in the lining of the esophagus.  CAUSES    · Increased body weight. This puts pressure  on the stomach, making acid rise from the stomach into the esophagus.  · Smoking. This increases acid production in the stomach.  · Drinking alcohol. This causes decreased pressure in the lower esophageal sphincter (valve or ring of muscle between the esophagus and stomach), allowing acid from the stomach into the esophagus.  · Late evening meals and a full stomach. This increases pressure and acid production in the stomach.  · A malformed lower esophageal sphincter.  Sometimes, no cause is found.  SYMPTOMS    · Burning pain in the lower part of the mid-chest behind the breastbone and in the mid-stomach area. This may occur twice a week or more often.  · Trouble swallowing.  · Sore throat.  · Dry cough.  · Asthma-like symptoms including chest tightness, shortness of breath, or wheezing.  DIAGNOSIS    Your caregiver may be able to diagnose GERD based on your symptoms. In some cases, X-rays and other tests may be done to check for complications or to check the condition of your stomach and esophagus.  TREATMENT    Your caregiver may recommend over-the-counter or prescription medicines to help decrease acid production. Ask your caregiver before starting or adding any new medicines.    HOME CARE INSTRUCTIONS    · Change the factors that you can control. Ask your caregiver for guidance concerning weight loss, quitting smoking, and alcohol consumption.  · Avoid foods and drinks that make your symptoms worse, such as:  ¨ Caffeine or alcoholic drinks.  ¨ Chocolate.  ¨ Peppermint or mint flavorings.  ¨ Garlic and onions.  ¨ Spicy foods.  ¨ Citrus fruits, such as oranges, meghan, or limes.  ¨ Tomato-based foods such as sauce, chili, salsa, and pizza.  ¨ Fried and fatty foods.  · Avoid lying down for the 3 hours prior to your bedtime or prior to taking a nap.  · Eat small, frequent meals instead of large meals.  · Wear loose-fitting clothing. Do not wear anything tight around your waist that causes pressure on your  stomach.  · Raise the head of your bed 6 to 8 inches with wood blocks to help you sleep. Extra pillows will not help.  · Only take over-the-counter or prescription medicines for pain, discomfort, or fever as directed by your caregiver.  · Do not take aspirin, ibuprofen, or other nonsteroidal anti-inflammatory drugs (NSAIDs).  SEEK IMMEDIATE MEDICAL CARE IF:    · You have pain in your arms, neck, jaw, teeth, or back.  · Your pain increases or changes in intensity or duration.  · You develop nausea, vomiting, or sweating (diaphoresis).  · You develop shortness of breath, or you faint.  · Your vomit is green, yellow, black, or looks like coffee grounds or blood.  · Your stool is red, bloody, or black.  These symptoms could be signs of other problems, such as heart disease, gastric bleeding, or esophageal bleeding.  MAKE SURE YOU:    · Understand these instructions.  · Will watch your condition.  · Will get help right away if you are not doing well or get worse.     This information is not intended to replace advice given to you by your health care provider. Make sure you discuss any questions you have with your health care provider.     Document Released: 09/27/2006 Document Revised: 01/08/2016 Document Reviewed: 04/13/2016  [employer] Patient Education ©2016 [employer]

## 2020-01-09 ENCOUNTER — OFFICE VISIT (OUTPATIENT)
Dept: GASTROENTEROLOGY | Facility: CLINIC | Age: 70
End: 2020-01-09

## 2020-01-09 VITALS
WEIGHT: 167 LBS | HEIGHT: 64 IN | HEART RATE: 64 BPM | TEMPERATURE: 97 F | SYSTOLIC BLOOD PRESSURE: 128 MMHG | DIASTOLIC BLOOD PRESSURE: 74 MMHG | BODY MASS INDEX: 28.51 KG/M2 | OXYGEN SATURATION: 98 %

## 2020-01-09 DIAGNOSIS — K21.00 GASTROESOPHAGEAL REFLUX DISEASE WITH ESOPHAGITIS: Primary | ICD-10-CM

## 2020-01-09 PROCEDURE — 99212 OFFICE O/P EST SF 10 MIN: CPT | Performed by: NURSE PRACTITIONER

## 2020-01-09 RX ORDER — OMEPRAZOLE 40 MG/1
40 CAPSULE, DELAYED RELEASE ORAL DAILY
Qty: 30 CAPSULE | Refills: 11 | Status: SHIPPED | OUTPATIENT
Start: 2020-01-09 | End: 2021-01-21 | Stop reason: SDUPTHER

## 2020-01-09 NOTE — PATIENT INSTRUCTIONS
Gastroesophageal Reflux Disease, Adult    Gastroesophageal reflux disease (GERD) happens when acid from your stomach flows up into the esophagus. When acid comes in contact with the esophagus, the acid causes soreness (inflammation) in the esophagus. Over time, GERD may create small holes (ulcers) in the lining of the esophagus.  CAUSES    · Increased body weight. This puts pressure on the stomach, making acid rise from the stomach into the esophagus.  · Smoking. This increases acid production in the stomach.  · Drinking alcohol. This causes decreased pressure in the lower esophageal sphincter (valve or ring of muscle between the esophagus and stomach), allowing acid from the stomach into the esophagus.  · Late evening meals and a full stomach. This increases pressure and acid production in the stomach.  · A malformed lower esophageal sphincter.  Sometimes, no cause is found.  SYMPTOMS    · Burning pain in the lower part of the mid-chest behind the breastbone and in the mid-stomach area. This may occur twice a week or more often.  · Trouble swallowing.  · Sore throat.  · Dry cough.  · Asthma-like symptoms including chest tightness, shortness of breath, or wheezing.  DIAGNOSIS    Your caregiver may be able to diagnose GERD based on your symptoms. In some cases, X-rays and other tests may be done to check for complications or to check the condition of your stomach and esophagus.  TREATMENT    Your caregiver may recommend over-the-counter or prescription medicines to help decrease acid production. Ask your caregiver before starting or adding any new medicines.    HOME CARE INSTRUCTIONS    · Change the factors that you can control. Ask your caregiver for guidance concerning weight loss, quitting smoking, and alcohol consumption.  · Avoid foods and drinks that make your symptoms worse, such as:  ¨ Caffeine or alcoholic drinks.  ¨ Chocolate.  ¨ Peppermint or mint flavorings.  ¨ Garlic and onions.  ¨ Spicy foods.  ¨ Citrus  fruits, such as oranges, meghan, or limes.  ¨ Tomato-based foods such as sauce, chili, salsa, and pizza.  ¨ Fried and fatty foods.  · Avoid lying down for the 3 hours prior to your bedtime or prior to taking a nap.  · Eat small, frequent meals instead of large meals.  · Wear loose-fitting clothing. Do not wear anything tight around your waist that causes pressure on your stomach.  · Raise the head of your bed 6 to 8 inches with wood blocks to help you sleep. Extra pillows will not help.  · Only take over-the-counter or prescription medicines for pain, discomfort, or fever as directed by your caregiver.  · Do not take aspirin, ibuprofen, or other nonsteroidal anti-inflammatory drugs (NSAIDs).  SEEK IMMEDIATE MEDICAL CARE IF:    · You have pain in your arms, neck, jaw, teeth, or back.  · Your pain increases or changes in intensity or duration.  · You develop nausea, vomiting, or sweating (diaphoresis).  · You develop shortness of breath, or you faint.  · Your vomit is green, yellow, black, or looks like coffee grounds or blood.  · Your stool is red, bloody, or black.  These symptoms could be signs of other problems, such as heart disease, gastric bleeding, or esophageal bleeding.  MAKE SURE YOU:    · Understand these instructions.  · Will watch your condition.  · Will get help right away if you are not doing well or get worse.     This information is not intended to replace advice given to you by your health care provider. Make sure you discuss any questions you have with your health care provider.     Document Released: 09/27/2006 Document Revised: 01/08/2016 Document Reviewed: 04/13/2016  [employer] Interactive Patient Education ©2016 [employer]

## 2021-01-18 NOTE — PROGRESS NOTES
Chief Complaint   Patient presents with   • Heartburn     needs omepraozle 40mg 3 month refills       PCP: Misael Miller APRN      Subjective   HPI    Pt presents to office with long history of GERD related symptoms.  GERD currently described as stable  Pt is maintained on Prilosec daily .  Pt denies heartburn, indigestion, N/V, abdominal pain, dysphagia.  Pt is compliant with medication instruction.  Last EGD: 2019.  This procedure reviewed with patient.  No bright red blood per rectum, no melena.  No abdominal pain.  No change in bowels.     Endoscopy (Dr Yang) 1/2019-normal   EGD 2018-severe esophagitis        CScope (Dr Yang) 2015     Past Medical History:   Diagnosis Date   • Anemia    • Disease of thyroid gland    • GERD (gastroesophageal reflux disease)    • Hearing loss, sensorineural 10/28/2016   • HLD (hyperlipidemia)    • HTN (hypertension)    • Osteopenia    • Tinnitus      Outpatient Medications Marked as Taking for the 1/21/21 encounter (Office Visit) with Fredy Blake APRN   Medication Sig Dispense Refill   • amLODIPine (NORVASC) 5 MG tablet 5 mg Daily.     • atenolol (TENORMIN) 50 MG tablet Take 50 mg by mouth 2 (Two) Times a Day.     • atorvastatin (LIPITOR) 20 MG tablet Take 20 mg by mouth Daily.     • Cyanocobalamin (VITAMIN B-12 IJ) Inject  as directed.     • furosemide (LASIX) 40 MG tablet Take 40 mg by mouth Daily As Needed.     • insulin glargine (LANTUS, SEMGLEE) 100 UNIT/ML injection Inject  under the skin into the appropriate area as directed Daily.     • levothyroxine (SYNTHROID, LEVOTHROID) 100 MCG tablet      • losartan (COZAAR) 100 MG tablet Take 100 mg by mouth Daily.     • metFORMIN (GLUCOPHAGE) 500 MG tablet Take 500 mg by mouth 2 (Two) Times a Day.     • omeprazole (priLOSEC) 40 MG capsule Take 1 capsule by mouth Daily. 90 capsule 3   • [DISCONTINUED] omeprazole (priLOSEC) 40 MG capsule Take 1 capsule by mouth Daily. 30 capsule 11     No Known Allergies  Social History      Socioeconomic History   • Marital status:      Spouse name: Not on file   • Number of children: Not on file   • Years of education: Not on file   • Highest education level: Not on file   Tobacco Use   • Smoking status: Current Every Day Smoker     Packs/day: 0.50     Years: 45.00     Pack years: 22.50   • Smokeless tobacco: Never Used   Substance and Sexual Activity   • Alcohol use: No   • Drug use: No     Family History   Problem Relation Age of Onset   • Diabetes Mother    • Stroke Mother    • Cancer Mother    • Heart failure Father    • Cancer Sister    • Colon cancer Neg Hx    • Colon polyps Neg Hx    • Esophageal cancer Neg Hx      Review of Systems   Constitutional: Negative for unexpected weight change.   Respiratory: Negative for shortness of breath.    Cardiovascular: Negative for chest pain.   Gastrointestinal: Negative for abdominal pain and anal bleeding.     Objective   Vitals:    01/21/21 1344   BP: 128/74   Pulse: 62   Temp: 97 °F (36.1 °C)   SpO2: 98%     Physical Exam  Constitutional:       Appearance: Normal appearance. She is well-developed.   Eyes:      General: No scleral icterus.  Cardiovascular:      Rate and Rhythm: Regular rhythm.      Heart sounds: Normal heart sounds. No murmur.   Pulmonary:      Effort: Pulmonary effort is normal. No accessory muscle usage.      Breath sounds: Normal breath sounds.   Abdominal:      General: Bowel sounds are normal. There is no distension.      Palpations: Abdomen is soft. There is no mass.      Tenderness: There is no abdominal tenderness. There is no guarding or rebound.   Skin:     General: Skin is warm and dry.      Coloration: Skin is not jaundiced.   Neurological:      Mental Status: She is alert.   Psychiatric:         Behavior: Behavior is cooperative.       Imaging Results (Most Recent)     None          Body mass index is 31.26 kg/m².    Assessment/Plan   Diagnoses and all orders for this visit:    1. History of colon polyps  (Primary)  -     polyethylene glycol (GoLYTELY) 236 g solution; Take 3,785 mL by mouth 1 (One) Time for 1 dose. Take as directed  Dispense: 3350 mL; Refill: 0  -     Case Request; Standing  -     Implement Anesthesia Orders Day of Procedure; Standing  -     Obtain Informed Consent; Standing  -     Case Request    2. Gastroesophageal reflux disease with esophagitis  -     omeprazole (priLOSEC) 40 MG capsule; Take 1 capsule by mouth Daily.  Dispense: 90 capsule; Refill: 3      COLONOSCOPY WITH ANESTHESIA (N/A)    Take PPI 30 min prior to breakfast   Decrease caffeine, nicotine, etoh- all contribute to acid reflux  Do not eat 2-3 hours within lying down, elevated HOB 4-6 inches    Patient is to continue all blood pressure and cardiac medications prior to procedure and has been advised to take medications morning of procedure   Pt verbalized understanding    Advised pt to stop use of NSAIDs, Fish Oil, and MV 5 days prior to procedure, per Dr Yang protocol.  Tylenol based products are ok to take.  Pt verbalized understanding.     All risks, benefits, alternatives, and indications of colonoscopy procedure have been discussed with the patient. Risks to include perforation of the colon requiring possible surgery or colostomy, risk of bleeding from biopsies or removal of colon tissue, possibility of missing a colon polyp or cancer, or adverse drug reaction.  Benefits to include the diagnosis and management of disease of the colon and rectum. Alternatives to include barium enema, radiographic evaluation, lab testing or no intervention. Pt verbalizes understanding and agrees to proceed with procedure.      Patient Instructions   Gastroesophageal Reflux Disease, Adult    Gastroesophageal reflux disease (GERD) happens when acid from your stomach flows up into the esophagus. When acid comes in contact with the esophagus, the acid causes soreness (inflammation) in the esophagus. Over time, GERD may create small holes (ulcers) in  the lining of the esophagus.  CAUSES    · Increased body weight. This puts pressure on the stomach, making acid rise from the stomach into the esophagus.  · Smoking. This increases acid production in the stomach.  · Drinking alcohol. This causes decreased pressure in the lower esophageal sphincter (valve or ring of muscle between the esophagus and stomach), allowing acid from the stomach into the esophagus.  · Late evening meals and a full stomach. This increases pressure and acid production in the stomach.  · A malformed lower esophageal sphincter.  Sometimes, no cause is found.  SYMPTOMS    · Burning pain in the lower part of the mid-chest behind the breastbone and in the mid-stomach area. This may occur twice a week or more often.  · Trouble swallowing.  · Sore throat.  · Dry cough.  · Asthma-like symptoms including chest tightness, shortness of breath, or wheezing.  DIAGNOSIS    Your caregiver may be able to diagnose GERD based on your symptoms. In some cases, X-rays and other tests may be done to check for complications or to check the condition of your stomach and esophagus.  TREATMENT    Your caregiver may recommend over-the-counter or prescription medicines to help decrease acid production. Ask your caregiver before starting or adding any new medicines.    HOME CARE INSTRUCTIONS    · Change the factors that you can control. Ask your caregiver for guidance concerning weight loss, quitting smoking, and alcohol consumption.  · Avoid foods and drinks that make your symptoms worse, such as:  ¨ Caffeine or alcoholic drinks.  ¨ Chocolate.  ¨ Peppermint or mint flavorings.  ¨ Garlic and onions.  ¨ Spicy foods.  ¨ Citrus fruits, such as oranges, meghan, or limes.  ¨ Tomato-based foods such as sauce, chili, salsa, and pizza.  ¨ Fried and fatty foods.  · Avoid lying down for the 3 hours prior to your bedtime or prior to taking a nap.  · Eat small, frequent meals instead of large meals.  · Wear loose-fitting clothing.  Do not wear anything tight around your waist that causes pressure on your stomach.  · Raise the head of your bed 6 to 8 inches with wood blocks to help you sleep. Extra pillows will not help.  · Only take over-the-counter or prescription medicines for pain, discomfort, or fever as directed by your caregiver.  · Do not take aspirin, ibuprofen, or other nonsteroidal anti-inflammatory drugs (NSAIDs).  SEEK IMMEDIATE MEDICAL CARE IF:    · You have pain in your arms, neck, jaw, teeth, or back.  · Your pain increases or changes in intensity or duration.  · You develop nausea, vomiting, or sweating (diaphoresis).  · You develop shortness of breath, or you faint.  · Your vomit is green, yellow, black, or looks like coffee grounds or blood.  · Your stool is red, bloody, or black.  These symptoms could be signs of other problems, such as heart disease, gastric bleeding, or esophageal bleeding.  MAKE SURE YOU:    · Understand these instructions.  · Will watch your condition.  · Will get help right away if you are not doing well or get worse.     This information is not intended to replace advice given to you by your health care provider. Make sure you discuss any questions you have with your health care provider.     Document Released: 09/27/2006 Document Revised: 01/08/2016 Document Reviewed: 04/13/2016  [employer] Interactive Patient Education ©2016 [employer]               Fredy Blake, MARSHALL  01/21/21

## 2021-01-21 ENCOUNTER — OFFICE VISIT (OUTPATIENT)
Dept: GASTROENTEROLOGY | Facility: CLINIC | Age: 71
End: 2021-01-21

## 2021-01-21 VITALS
OXYGEN SATURATION: 98 % | BODY MASS INDEX: 30.82 KG/M2 | HEART RATE: 62 BPM | SYSTOLIC BLOOD PRESSURE: 128 MMHG | TEMPERATURE: 97 F | HEIGHT: 65 IN | WEIGHT: 185 LBS | DIASTOLIC BLOOD PRESSURE: 74 MMHG

## 2021-01-21 DIAGNOSIS — Z86.010 HISTORY OF COLON POLYPS: Primary | ICD-10-CM

## 2021-01-21 DIAGNOSIS — K21.00 GASTROESOPHAGEAL REFLUX DISEASE WITH ESOPHAGITIS: ICD-10-CM

## 2021-01-21 PROCEDURE — 99213 OFFICE O/P EST LOW 20 MIN: CPT | Performed by: NURSE PRACTITIONER

## 2021-01-21 RX ORDER — INSULIN GLARGINE 100 [IU]/ML
INJECTION, SOLUTION SUBCUTANEOUS DAILY
COMMUNITY

## 2021-01-21 RX ORDER — OMEPRAZOLE 40 MG/1
40 CAPSULE, DELAYED RELEASE ORAL DAILY
Qty: 90 CAPSULE | Refills: 3 | Status: SHIPPED | OUTPATIENT
Start: 2021-01-21

## 2021-01-21 NOTE — PATIENT INSTRUCTIONS
Gastroesophageal Reflux Disease, Adult    Gastroesophageal reflux disease (GERD) happens when acid from your stomach flows up into the esophagus. When acid comes in contact with the esophagus, the acid causes soreness (inflammation) in the esophagus. Over time, GERD may create small holes (ulcers) in the lining of the esophagus.  CAUSES    · Increased body weight. This puts pressure on the stomach, making acid rise from the stomach into the esophagus.  · Smoking. This increases acid production in the stomach.  · Drinking alcohol. This causes decreased pressure in the lower esophageal sphincter (valve or ring of muscle between the esophagus and stomach), allowing acid from the stomach into the esophagus.  · Late evening meals and a full stomach. This increases pressure and acid production in the stomach.  · A malformed lower esophageal sphincter.  Sometimes, no cause is found.  SYMPTOMS    · Burning pain in the lower part of the mid-chest behind the breastbone and in the mid-stomach area. This may occur twice a week or more often.  · Trouble swallowing.  · Sore throat.  · Dry cough.  · Asthma-like symptoms including chest tightness, shortness of breath, or wheezing.  DIAGNOSIS    Your caregiver may be able to diagnose GERD based on your symptoms. In some cases, X-rays and other tests may be done to check for complications or to check the condition of your stomach and esophagus.  TREATMENT    Your caregiver may recommend over-the-counter or prescription medicines to help decrease acid production. Ask your caregiver before starting or adding any new medicines.    HOME CARE INSTRUCTIONS    · Change the factors that you can control. Ask your caregiver for guidance concerning weight loss, quitting smoking, and alcohol consumption.  · Avoid foods and drinks that make your symptoms worse, such as:  ¨ Caffeine or alcoholic drinks.  ¨ Chocolate.  ¨ Peppermint or mint flavorings.  ¨ Garlic and onions.  ¨ Spicy foods.  ¨ Citrus  fruits, such as oranges, meghan, or limes.  ¨ Tomato-based foods such as sauce, chili, salsa, and pizza.  ¨ Fried and fatty foods.  · Avoid lying down for the 3 hours prior to your bedtime or prior to taking a nap.  · Eat small, frequent meals instead of large meals.  · Wear loose-fitting clothing. Do not wear anything tight around your waist that causes pressure on your stomach.  · Raise the head of your bed 6 to 8 inches with wood blocks to help you sleep. Extra pillows will not help.  · Only take over-the-counter or prescription medicines for pain, discomfort, or fever as directed by your caregiver.  · Do not take aspirin, ibuprofen, or other nonsteroidal anti-inflammatory drugs (NSAIDs).  SEEK IMMEDIATE MEDICAL CARE IF:    · You have pain in your arms, neck, jaw, teeth, or back.  · Your pain increases or changes in intensity or duration.  · You develop nausea, vomiting, or sweating (diaphoresis).  · You develop shortness of breath, or you faint.  · Your vomit is green, yellow, black, or looks like coffee grounds or blood.  · Your stool is red, bloody, or black.  These symptoms could be signs of other problems, such as heart disease, gastric bleeding, or esophageal bleeding.  MAKE SURE YOU:    · Understand these instructions.  · Will watch your condition.  · Will get help right away if you are not doing well or get worse.     This information is not intended to replace advice given to you by your health care provider. Make sure you discuss any questions you have with your health care provider.     Document Released: 09/27/2006 Document Revised: 01/08/2016 Document Reviewed: 04/13/2016  [employer] Interactive Patient Education ©2016 [employer]

## 2021-01-26 ENCOUNTER — TELEPHONE (OUTPATIENT)
Dept: GASTROENTEROLOGY | Facility: CLINIC | Age: 71
End: 2021-01-26

## 2021-01-26 NOTE — TELEPHONE ENCOUNTER
Patient called and cancelled her colonoscopy with us - this was scheduled for 02/09/2021 at 10:15am. Does not want to reschedule - patient states there is to much going on in the world right now.     Sent letter to Misael Miller APRN

## 2023-09-15 NOTE — ANESTHESIA POSTPROCEDURE EVALUATION
Patient: Kika Morocho    Procedure Summary     Date:  01/11/19 Room / Location:  [facility] ENDOSCOPY 4 / [facility]    Anesthesia Start:  1045 Anesthesia Stop:  1054    Procedure:  ESOPHAGOGASTRODUODENOSCOPY WITH ANESTHESIA (N/A ) Diagnosis:       Gastroesophageal reflux disease with esophagitis      (Gastroesophageal reflux disease with esophagitis [K21.0])    Surgeon:  Gianni Yang DO Provider:  LIAT Armstrong CRNA    Anesthesia Type:  general ASA Status:  3          Anesthesia Type: general  Last vitals  BP   147/76 (01/11/19 1010)   Temp   98 °F (36.7 °C) (01/11/19 1010)   Pulse   68 (01/11/19 1010)   Resp   20 (01/11/19 1010)     SpO2   99 % (01/11/19 1010)     Post Anesthesia Care and Evaluation    Patient location during evaluation: PACU  Patient participation: complete - patient participated  Level of consciousness: awake and alert  Pain score: 0  Pain management: adequate  Airway patency: patent  Anesthetic complications: No anesthetic complications    Cardiovascular status: acceptable and stable  Respiratory status: acceptable and unassisted  Hydration status: acceptable       1.5 1.8

## (undated) DEVICE — CUFF,BP,DISP,1 TUBE,ADULT,HP: Brand: MEDLINE

## (undated) DEVICE — ENDOGATOR AUXILIARY WATER JET CONNECTOR: Brand: ENDOGATOR

## (undated) DEVICE — SENSR O2 OXIMAX FNGR A/ 18IN NONSTR

## (undated) DEVICE — THE CHANNEL CLEANING BRUSH IS A NYLON FLEXI BRUSH ATTACHED TO A FLEXIBLE PLASTIC SHEATH DESIGNED TO SAFELY REMOVE DEBRIS FROM FLEXIBLE ENDOSCOPES.

## (undated) DEVICE — FRCP BIOP COLD ENDOJAW ALLGTR W/NDL 2.8X2300MM BLU

## (undated) DEVICE — Device: Brand: DEFENDO AIR/WATER/SUCTION AND BIOPSY VALVE

## (undated) DEVICE — YANKAUER,BULB TIP WITH VENT: Brand: ARGYLE

## (undated) DEVICE — TBG SMPL FLTR LINE NASL 02/C02 A/ BX/100

## (undated) DEVICE — CONMED SCOPE SAVER BITE BLOCK, 20X27 MM: Brand: SCOPE SAVER